# Patient Record
Sex: MALE | Race: WHITE | NOT HISPANIC OR LATINO | Employment: FULL TIME | URBAN - METROPOLITAN AREA
[De-identification: names, ages, dates, MRNs, and addresses within clinical notes are randomized per-mention and may not be internally consistent; named-entity substitution may affect disease eponyms.]

---

## 2017-01-31 ENCOUNTER — OFFICE VISIT (OUTPATIENT)
Dept: SURGERY | Facility: CLINIC | Age: 27
End: 2017-01-31
Payer: COMMERCIAL

## 2017-01-31 ENCOUNTER — HOSPITAL ENCOUNTER (OUTPATIENT)
Dept: RADIOLOGY | Facility: HOSPITAL | Age: 27
Discharge: HOME | End: 2017-01-31
Attending: SURGERY
Payer: COMMERCIAL

## 2017-01-31 VITALS
TEMPERATURE: 99 F | HEART RATE: 46 BPM | HEIGHT: 68 IN | SYSTOLIC BLOOD PRESSURE: 123 MMHG | DIASTOLIC BLOOD PRESSURE: 80 MMHG | BODY MASS INDEX: 43.35 KG/M2 | WEIGHT: 286 LBS

## 2017-01-31 DIAGNOSIS — R22.0 SCALP MASS: ICD-10-CM

## 2017-01-31 DIAGNOSIS — R22.0 MASS OF HEAD: Primary | ICD-10-CM

## 2017-01-31 PROCEDURE — 99999 PR PBB SHADOW E&M-NEW PATIENT-LVL III: CPT | Mod: PBBFAC,,, | Performed by: SURGERY

## 2017-01-31 PROCEDURE — 1159F MED LIST DOCD IN RCRD: CPT | Mod: S$GLB,,, | Performed by: SURGERY

## 2017-01-31 PROCEDURE — 70450 CT HEAD/BRAIN W/O DYE: CPT | Mod: TC

## 2017-01-31 PROCEDURE — 70450 CT HEAD/BRAIN W/O DYE: CPT | Mod: 26,,, | Performed by: RADIOLOGY

## 2017-01-31 PROCEDURE — 99203 OFFICE O/P NEW LOW 30 MIN: CPT | Mod: S$GLB,,, | Performed by: SURGERY

## 2017-01-31 NOTE — Clinical Note
January 31, 2017      Vicky Bravo MD  [address] [place] [address]           [facility]  [address]  [place] [address]  Phone: [phone]          Patient: Gurwinder Sainz   MR Number: [id number]   YOB: 1990   Date of Visit: 1/31/2017       Dear Dr. Vicky Bravo:    Thank you for referring Gurwinder Sainz to me for evaluation. Attached you will find relevant portions of my assessment and plan of care.    If you have questions, please do not hesitate to call me. I look forward to following Gurwinder Sainz along with you.    Sincerely,    Steve Tucker MD    Enclosure  CC:  No Recipients    If you would like to receive this communication electronically, please contact [email or web address] or [phone] to request more information on [facility] Link access.    For providers and/or their staff who would like to refer a patient to [facility], please contact us through our one-stop-shop provider referral line, [facility], at [phone].    If you feel you have received this communication in error or would no longer like to receive these types of communications, please e-mail [email or web address]

## 2017-01-31 NOTE — PROGRESS NOTES
"History & Physical    SUBJECTIVE:     History of Present Illness:  Patient is a 26 y.o. male presents with scalp mass.  He first noticed it 2 years ago.  He thinks it is more noticeable.  He thinks it is asymptomatic but he does get sinus trouble.      Chief Complaint   Patient presents with    Consult     scalp mass       Review of patient's allergies indicates:   Allergen Reactions    Flagyl [metronidazole hcl]     Pecan nut     Pineapple     [place]        No current outpatient prescriptions on file.     No current facility-administered medications for this visit.        History reviewed. No pertinent past medical history.  Past Surgical History   Procedure Laterality Date    Testicular torsion procedure      Tonsillectomy      Upper wisdom teeth       Family History   Problem Relation Age of Onset    Diabetes Mother     Diabetes Father      Social History   Substance Use Topics    Smoking status: Former Smoker     Quit date: 7/31/2015    Smokeless tobacco: None    Alcohol use None        Review of Systems:  Review of Systems   Constitutional: Negative for fever and unexpected weight change.   Respiratory: Negative for chest tightness and shortness of breath.    Cardiovascular: Negative for chest pain.   Gastrointestinal: Negative for abdominal pain, constipation, diarrhea, nausea and vomiting.   Genitourinary: Negative for difficulty urinating and dysuria.   Skin: Positive for wound. Negative for rash.        Has burns on his forearms from work ()   Hematological: Does not bruise/bleed easily.       OBJECTIVE:     Vital Signs (Most Recent)  Temp: 98.5 °F (36.9 °C) (01/31/17 0735)  Pulse: (!) 46 (01/31/17 0735)  BP: 123/80 (01/31/17 0735)  5' 8" (1.727 m)  129.7 kg (286 lb)     Physical Exam:  Physical Exam   Constitutional: He is oriented to person, place, and time. He appears well-developed and well-nourished.   Neck: Normal range of motion. Neck supple.   Cardiovascular: Normal rate, regular " rhythm and normal heart sounds.    Pulmonary/Chest: Effort normal and breath sounds normal.   Neurological: He is alert and oriented to person, place, and time.   Skin: Skin is warm and dry.        Psychiatric: He has a normal mood and affect. His behavior is normal. Judgment and thought content normal.   Vitals reviewed.      Laboratory  none available    Diagnostic Results:  none available    ASSESSMENT/PLAN:     Scalp mass, fixed    PLAN:Plan     CT

## 2017-01-31 NOTE — LETTER
[facility] - General Surgery  [address]  [place] [address]  Phone: [phone] January 31, 2017      Vicky Bravo MD  [address] [facility] [address]    Patient: Gurwinder Sainz   MR Number: [id number]   YOB: 1990   Date of Visit: 1/31/2017     Dear Dr. Bravo:    Thank you for referring Gurwinder Sainz to me for evaluation. Below are the relevant portions of my assessment and plan of care.    Patient is a 26-year-old male presents with scalp mass, fixed.     PLAN:   -  CT    If you have questions, please do not hesitate to call me. I look forward to following Gurwinder along with you.    Sincerely,      Steve Tucker MD   Section Head - General, Laparoscopic, Bariatric  Acute Care and Oncologic Surgery   - Surgical Weight Loss Program  [facility]    WSR/camila

## 2017-02-01 ENCOUNTER — TELEPHONE (OUTPATIENT)
Dept: SURGERY | Facility: CLINIC | Age: 27
End: 2017-02-01

## 2017-02-01 NOTE — TELEPHONE ENCOUNTER
Notified pt of CT results and that Dr. Tucker has contacted neurosurgery for recommendations on removal.  Pt verbalizes understanding.

## 2017-02-01 NOTE — TELEPHONE ENCOUNTER
----- Message from Steve Tucker MD sent at 2/1/2017  2:38 PM CST -----  I have.  I sent a letter to Dr. Núñez to see what his thoughts are on it and he hasn't answered, yet.  ----- Message -----     From: Jyotsna Lara RN     Sent: 1/31/2017   4:41 PM       To: Steve Tucker MD    Can you please review his CT results?  ----- Message -----     From: Jae Kumar     Sent: 1/31/2017   3:57 PM       To: Garrett Aparicio Staff    Patient states that he needs to speak with nurse in ref to if his ins cleared him for the CT and was the peer to peer done;pt also states that he would like to know if his results were in from CT//please call back at [phone]//thank you

## 2017-02-01 NOTE — TELEPHONE ENCOUNTER
----- Message from Tommy Sigala sent at 2/1/2017  4:36 PM CST -----  Pt missed call regarding results. Please call him back at [phone]

## 2017-02-07 ENCOUNTER — TELEPHONE (OUTPATIENT)
Dept: SURGERY | Facility: CLINIC | Age: 27
End: 2017-02-07

## 2017-02-07 NOTE — TELEPHONE ENCOUNTER
----- Message from Tommy Sigala sent at 2/7/2017  9:59 AM CST -----  Pt is returning missed phone call. Please call pt back at [phone]

## 2017-02-07 NOTE — TELEPHONE ENCOUNTER
Notified pt of 's recommendations of seeing Dr. Núñez.  Pt to call to set up appt with his office.

## 2017-02-07 NOTE — TELEPHONE ENCOUNTER
----- Message from Steve Tucker MD sent at 2/3/2017  4:43 PM CST -----   Dr. Núñez says it looks like an osteoid osteoma, a benign lesion.  He could see Dr. Núñez electively to hear about it and options.  I know he is concerned as it has been growing.  ----- Message -----     From: Steve Tucker MD     Sent: 1/31/2017   3:57 PM       To: Aren Núñez MD, Steve Tucker MD    I got this ct to look at a lesion of the right forehead which is growing.  Do you know what do I do about that and the other findings?    Thanks.

## 2018-04-05 ENCOUNTER — HOSPITAL ENCOUNTER (EMERGENCY)
Facility: HOSPITAL | Age: 28
Discharge: HOME | End: 2018-04-05
Attending: FAMILY MEDICINE
Payer: COMMERCIAL

## 2018-04-05 VITALS
BODY MASS INDEX: 42.21 KG/M2 | RESPIRATION RATE: 18 BRPM | DIASTOLIC BLOOD PRESSURE: 92 MMHG | HEIGHT: 69 IN | HEART RATE: 53 BPM | WEIGHT: 285 LBS | OXYGEN SATURATION: 96 % | SYSTOLIC BLOOD PRESSURE: 143 MMHG | TEMPERATURE: 99 F

## 2018-04-05 DIAGNOSIS — K92.1 BLOOD IN STOOL: Primary | ICD-10-CM

## 2018-04-05 LAB
ALBUMIN SERPL BCP-MCNC: 4.3 G/DL
ALP SERPL-CCNC: 104 U/L
ALT SERPL W/O P-5'-P-CCNC: 27 U/L
ANION GAP SERPL CALC-SCNC: 9 MMOL/L
AST SERPL-CCNC: 23 U/L
BASOPHILS # BLD AUTO: 0.06 K/UL
BASOPHILS NFR BLD: 0.6 %
BILIRUB SERPL-MCNC: 0.9 MG/DL
BILIRUB UR QL STRIP: NEGATIVE
BUN SERPL-MCNC: 12 MG/DL
CALCIUM SERPL-MCNC: 10 MG/DL
CHLORIDE SERPL-SCNC: 105 MMOL/L
CLARITY UR REFRACT.AUTO: CLEAR
CO2 SERPL-SCNC: 24 MMOL/L
COLOR UR AUTO: YELLOW
CREAT SERPL-MCNC: 0.8 MG/DL
DIFFERENTIAL METHOD: NORMAL
EOSINOPHIL # BLD AUTO: 0.2 K/UL
EOSINOPHIL NFR BLD: 1.7 %
ERYTHROCYTE [DISTWIDTH] IN BLOOD BY AUTOMATED COUNT: 13.3 %
EST. GFR  (AFRICAN AMERICAN): >60 ML/MIN/1.73 M^2
EST. GFR  (NON AFRICAN AMERICAN): >60 ML/MIN/1.73 M^2
GLUCOSE SERPL-MCNC: 92 MG/DL
GLUCOSE UR QL STRIP: NEGATIVE
HCT VFR BLD AUTO: 47.8 %
HGB BLD-MCNC: 16.2 G/DL
HGB UR QL STRIP: NEGATIVE
IMM GRANULOCYTES # BLD AUTO: 0.03 K/UL
IMM GRANULOCYTES NFR BLD AUTO: 0.3 %
KETONES UR QL STRIP: NEGATIVE
LEUKOCYTE ESTERASE UR QL STRIP: NEGATIVE
LIPASE SERPL-CCNC: 24 U/L
LYMPHOCYTES # BLD AUTO: 3.1 K/UL
LYMPHOCYTES NFR BLD: 30.2 %
MCH RBC QN AUTO: 28.9 PG
MCHC RBC AUTO-ENTMCNC: 33.9 G/DL
MCV RBC AUTO: 85 FL
MONOCYTES # BLD AUTO: 1 K/UL
MONOCYTES NFR BLD: 9.6 %
NEUTROPHILS # BLD AUTO: 5.9 K/UL
NEUTROPHILS NFR BLD: 57.6 %
NITRITE UR QL STRIP: NEGATIVE
NRBC BLD-RTO: 0 /100 WBC
PH UR STRIP: 6 [PH] (ref 5–8)
PLATELET # BLD AUTO: 332 K/UL
PMV BLD AUTO: 9.6 FL
POTASSIUM SERPL-SCNC: 4.4 MMOL/L
PROT SERPL-MCNC: 8.2 G/DL
PROT UR QL STRIP: NEGATIVE
RBC # BLD AUTO: 5.61 M/UL
SODIUM SERPL-SCNC: 138 MMOL/L
SP GR UR STRIP: 1.02 (ref 1–1.03)
URN SPEC COLLECT METH UR: NORMAL
UROBILINOGEN UR STRIP-ACNC: NEGATIVE EU/DL
WBC # BLD AUTO: 10.29 K/UL

## 2018-04-05 PROCEDURE — 96374 THER/PROPH/DIAG INJ IV PUSH: CPT

## 2018-04-05 PROCEDURE — 96361 HYDRATE IV INFUSION ADD-ON: CPT

## 2018-04-05 PROCEDURE — 85025 COMPLETE CBC W/AUTO DIFF WBC: CPT

## 2018-04-05 PROCEDURE — 80053 COMPREHEN METABOLIC PANEL: CPT

## 2018-04-05 PROCEDURE — 63600175 PHARM REV CODE 636 W HCPCS: Performed by: PHYSICIAN ASSISTANT

## 2018-04-05 PROCEDURE — 99283 EMERGENCY DEPT VISIT LOW MDM: CPT | Mod: ,,, | Performed by: FAMILY MEDICINE

## 2018-04-05 PROCEDURE — 83690 ASSAY OF LIPASE: CPT

## 2018-04-05 PROCEDURE — 81003 URINALYSIS AUTO W/O SCOPE: CPT

## 2018-04-05 PROCEDURE — 99283 EMERGENCY DEPT VISIT LOW MDM: CPT | Mod: 25

## 2018-04-05 PROCEDURE — 25000003 PHARM REV CODE 250: Performed by: PHYSICIAN ASSISTANT

## 2018-04-05 RX ORDER — KETOROLAC TROMETHAMINE 30 MG/ML
10 INJECTION, SOLUTION INTRAMUSCULAR; INTRAVENOUS
Status: COMPLETED | OUTPATIENT
Start: 2018-04-05 | End: 2018-04-05

## 2018-04-05 RX ORDER — DICYCLOMINE HYDROCHLORIDE 20 MG/1
20 TABLET ORAL 2 TIMES DAILY PRN
Qty: 15 TABLET | Refills: 0 | Status: SHIPPED | OUTPATIENT
Start: 2018-04-05 | End: 2018-04-10

## 2018-04-05 RX ADMIN — SODIUM CHLORIDE 1000 ML: 0.9 INJECTION, SOLUTION INTRAVENOUS at 11:04

## 2018-04-05 RX ADMIN — KETOROLAC TROMETHAMINE 10 MG: 30 INJECTION, SOLUTION INTRAMUSCULAR at 11:04

## 2018-04-05 NOTE — ED NOTES
At discharge pt AOX3, resp even/unlabored,skin  W/d, GARCIA well, pt reports feeling better upon discharge, NAD at this time.

## 2018-04-05 NOTE — ED TRIAGE NOTES
Patient states he has blood in his stool and abdominal cramps x 1 week.  Patient states there were blood clots in the toilet.

## 2018-04-05 NOTE — ED NOTES
LOC: The patient is awake, alert and aware of environment with an appropriate affect, the patient is oriented x 3 and speaking appropriately.  APPEARANCE: Patient resting comfortably and in no acute distress, patient is clean and well groomed, patient's clothing is properly fastened.  SKIN: The skin is warm and dry, color consistent with ethnicity, patient has normal skin turgor and moist mucus membranes, skin intact, no breakdown or bruising noted.  MUSCULOSKELETAL: Patient moving all extremities well, no obvious swelling or deformities noted.  RESPIRATORY: Airway is open and patent, respirations are spontaneous, patient has a normal effort and rate, no accessory muscle use noted.  CARDIAC: Patient has a normal rate and rhythm, no periphreal edema noted, capillary refill < 3 seconds.  ABDOMEN: Pt c/o abdominal cramping, bloating, and blood in stool.   NEUROLOGIC: eyes open spontaneously, behavior appropriate to situation, follows commands, facial expression symmetrical, bilateral hand grasp equal and even, purposeful motor response noted, normal sensation in all extremities when touched with a finger.

## 2018-04-05 NOTE — ED PROVIDER NOTES
Encounter Date: 4/5/2018       History     Chief Complaint   Patient presents with    Rectal Bleeding     Patient is a 27-year-old male with past medical history of abdominal pain and rectal bleeding who presents to the emergency department due to a 1 week history of bloody stool.  Patient states that he had an episode of bright red blood per rectum last Thursday.  Patient states he's had this previously and was seen in [place] by GI.  Patient states he was diagnosed with colitis and discharged on antibiotics.  Patient states that after having a bloody bowel movement last Thursday he contacted Dr. Walker and GI and has appointment with him on Tuesday.  Patient states that he had an additional episode of bright red blood per rectum this morning, as well as blood in his stool.  Patient states he's been having a cramping sensation but no diarrhea.  Patient denies any fevers, chills, chest pain, shortness of breath, or any other complaints.          Review of patient's allergies indicates:   Allergen Reactions    Flagyl [metronidazole hcl]     Pecan nut     Pineapple     [place]      History reviewed. No pertinent past medical history.  Past Surgical History:   Procedure Laterality Date    testicular torsion procedure      TONSILLECTOMY      upper wisdom teeth       Family History   Problem Relation Age of Onset    Diabetes Mother     Diabetes Father      Social History   Substance Use Topics    Smoking status: Former Smoker     Quit date: 7/31/2015    Smokeless tobacco: Never Used    Alcohol use Yes      Comment: once a month     Review of Systems   Constitutional: Negative for activity change, appetite change, diaphoresis, fatigue and fever.   HENT: Negative for congestion, dental problem, drooling, ear pain, facial swelling, sore throat and trouble swallowing.    Eyes: Negative for pain, discharge and visual disturbance.   Respiratory: Negative for apnea, cough, chest tightness and shortness of breath.     Cardiovascular: Negative for chest pain and palpitations.   Gastrointestinal: Positive for blood in stool. Negative for abdominal distention, anal bleeding, diarrhea, nausea and vomiting.   Endocrine: Negative for cold intolerance and polydipsia.   Genitourinary: Negative for decreased urine volume, difficulty urinating, enuresis, frequency and hematuria.   Musculoskeletal: Negative for arthralgias, gait problem, myalgias and neck stiffness.   Skin: Negative for color change and pallor.   Allergic/Immunologic: Negative for environmental allergies.   Neurological: Negative for dizziness, syncope, numbness and headaches.   Psychiatric/Behavioral: Negative for agitation, confusion and dysphoric mood.       Physical Exam     Initial Vitals [04/05/18 0945]   BP Pulse Resp Temp SpO2   (!) 137/96 68 20 98.3 °F (36.8 °C) 98 %      MAP       109.67         Physical Exam    Nursing note and vitals reviewed.  Constitutional: He appears well-developed and well-nourished. He is not diaphoretic. No distress.   HENT:   Head: Normocephalic and atraumatic.   Neck: Normal range of motion. Neck supple.   Cardiovascular: Normal rate, regular rhythm and normal heart sounds. Exam reveals no gallop and no friction rub.    No murmur heard.  Pulmonary/Chest: Breath sounds normal. He has no wheezes. He has no rhonchi. He has no rales.   Abdominal: Soft. Bowel sounds are normal. There is no tenderness. There is no rebound and no guarding.   Genitourinary: Rectal exam shows guaiac negative stool. Guaiac negative stool. : Acceptable.  Musculoskeletal: Normal range of motion.   Neurological: He is alert and oriented to person, place, and time.   Skin: Skin is warm and dry. No rash noted. No erythema.   Psychiatric: He has a normal mood and affect.         ED Course   Procedures  Labs Reviewed   CBC W/ AUTO DIFFERENTIAL   COMPREHENSIVE METABOLIC PANEL   LIPASE   URINALYSIS                   APC / Resident Notes:   Patient is a  27-year-old male with past medical history of abdominal pain and rectal bleeding who presents to the emergency department due to a 1 week history of bloody stool.  Physical exam reveals male in no acute distress.  Heart regular rate and rhythm.  Lungs clear to auscultation bilaterally.  Abdomen soft nontender nondistended.  Will obtain lab work.    Urinalysis unremarkable.  CBC shows hemoglobin of 16.2.  CMP unremarkable.  Lipase 24.  Patient will be discharged home in stable condition and is to follow-up with GI on Tuesday.  Patient will be given Bentyl for crampy abdominal pain.  Plan of treatment discussed with attending physician he is agreeable.                 Clinical Impression:   There were no encounter diagnoses.    Disposition:   Disposition: Discharged  Condition: Stable                        Rachna Gupta PA-C  04/05/18 1200       Rachna Gupta PA-C  04/05/18 1201       Rachna Gupta PA-C  04/05/18 5471

## 2018-04-10 ENCOUNTER — OFFICE VISIT (OUTPATIENT)
Dept: SURGERY | Facility: CLINIC | Age: 28
End: 2018-04-10
Payer: COMMERCIAL

## 2018-04-10 ENCOUNTER — TELEPHONE (OUTPATIENT)
Dept: ENDOSCOPY | Facility: HOSPITAL | Age: 28
End: 2018-04-10

## 2018-04-10 ENCOUNTER — LAB VISIT (OUTPATIENT)
Dept: LAB | Facility: HOSPITAL | Age: 28
End: 2018-04-10
Attending: COLON & RECTAL SURGERY
Payer: COMMERCIAL

## 2018-04-10 VITALS
WEIGHT: 288.38 LBS | SYSTOLIC BLOOD PRESSURE: 138 MMHG | HEART RATE: 72 BPM | DIASTOLIC BLOOD PRESSURE: 87 MMHG | HEIGHT: 69 IN | BODY MASS INDEX: 42.71 KG/M2

## 2018-04-10 DIAGNOSIS — Z12.11 SPECIAL SCREENING FOR MALIGNANT NEOPLASMS, COLON: Primary | ICD-10-CM

## 2018-04-10 DIAGNOSIS — K62.5 RECTAL BLEEDING: ICD-10-CM

## 2018-04-10 DIAGNOSIS — K92.1 HEMATOCHEZIA: Primary | ICD-10-CM

## 2018-04-10 LAB
ALBUMIN SERPL BCP-MCNC: 4.2 G/DL
ALP SERPL-CCNC: 95 U/L
ALT SERPL W/O P-5'-P-CCNC: 26 U/L
ANION GAP SERPL CALC-SCNC: 7 MMOL/L
AST SERPL-CCNC: 21 U/L
BASOPHILS # BLD AUTO: 0.06 K/UL
BASOPHILS NFR BLD: 0.7 %
BILIRUB SERPL-MCNC: 0.6 MG/DL
BUN SERPL-MCNC: 14 MG/DL
CALCIUM SERPL-MCNC: 9.5 MG/DL
CHLORIDE SERPL-SCNC: 104 MMOL/L
CO2 SERPL-SCNC: 26 MMOL/L
CREAT SERPL-MCNC: 0.9 MG/DL
CRP SERPL-MCNC: 5.3 MG/L
DIFFERENTIAL METHOD: NORMAL
EOSINOPHIL # BLD AUTO: 0.1 K/UL
EOSINOPHIL NFR BLD: 1.6 %
ERYTHROCYTE [DISTWIDTH] IN BLOOD BY AUTOMATED COUNT: 13.3 %
EST. GFR  (AFRICAN AMERICAN): >60 ML/MIN/1.73 M^2
EST. GFR  (NON AFRICAN AMERICAN): >60 ML/MIN/1.73 M^2
GLUCOSE SERPL-MCNC: 102 MG/DL
HCT VFR BLD AUTO: 46.3 %
HGB BLD-MCNC: 15.6 G/DL
IMM GRANULOCYTES # BLD AUTO: 0.02 K/UL
IMM GRANULOCYTES NFR BLD AUTO: 0.2 %
LYMPHOCYTES # BLD AUTO: 2.8 K/UL
LYMPHOCYTES NFR BLD: 30.7 %
MCH RBC QN AUTO: 28.8 PG
MCHC RBC AUTO-ENTMCNC: 33.7 G/DL
MCV RBC AUTO: 86 FL
MONOCYTES # BLD AUTO: 0.8 K/UL
MONOCYTES NFR BLD: 9 %
NEUTROPHILS # BLD AUTO: 5.2 K/UL
NEUTROPHILS NFR BLD: 57.8 %
NRBC BLD-RTO: 0 /100 WBC
PLATELET # BLD AUTO: 342 K/UL
PMV BLD AUTO: 10.1 FL
POTASSIUM SERPL-SCNC: 3.9 MMOL/L
PREALB SERPL-MCNC: 24 MG/DL
PROT SERPL-MCNC: 7.9 G/DL
RBC # BLD AUTO: 5.41 M/UL
SODIUM SERPL-SCNC: 137 MMOL/L
WBC # BLD AUTO: 9.03 K/UL

## 2018-04-10 PROCEDURE — 86140 C-REACTIVE PROTEIN: CPT

## 2018-04-10 PROCEDURE — 36415 COLL VENOUS BLD VENIPUNCTURE: CPT

## 2018-04-10 PROCEDURE — 84134 ASSAY OF PREALBUMIN: CPT

## 2018-04-10 PROCEDURE — 80053 COMPREHEN METABOLIC PANEL: CPT

## 2018-04-10 PROCEDURE — 99999 PR PBB SHADOW E&M-EST. PATIENT-LVL III: CPT | Mod: PBBFAC,,, | Performed by: COLON & RECTAL SURGERY

## 2018-04-10 PROCEDURE — 99204 OFFICE O/P NEW MOD 45 MIN: CPT | Mod: S$GLB,,, | Performed by: COLON & RECTAL SURGERY

## 2018-04-10 PROCEDURE — 85025 COMPLETE CBC W/AUTO DIFF WBC: CPT

## 2018-04-10 RX ORDER — SODIUM, POTASSIUM,MAG SULFATES 17.5-3.13G
1 SOLUTION, RECONSTITUTED, ORAL ORAL ONCE
Qty: 1 BOTTLE | Refills: 0 | Status: SHIPPED | OUTPATIENT
Start: 2018-04-10 | End: 2018-04-10

## 2018-04-10 NOTE — PROGRESS NOTES
CRS Office Visit History and Physical    Referring Md:   Virgil Lopez Md  [address] [facility]  [place] [address]    SUBJECTIVE:     Chief Complaint: rectal bleeding    History of Present Illness:  Patient is a 27 y.o. male presents with rectal bleeding.  2 weeks 3 weeks.  Ten-year history of diarrhea which has been evaluated in the in the past with colonoscopy.  Has never been treated for inflammatory bowel disease.  He has a sister with ulcerative colitis.  Reports crampy left-sided abdominal pain on a daily basis.  Pain comes and goes.  Associated with fried food and acidic food.  5 pound weight loss over 3 weeks.    Last Colonoscopy: 2012      No past medical history on file.    Past Surgical History:   Procedure Laterality Date    testicular torsion procedure      TONSILLECTOMY      upper wisdom teeth         Review of patient's allergies indicates:   Allergen Reactions    Flagyl [metronidazole hcl]     Pecan nut     Pineapple     [place]        Current Outpatient Prescriptions on File Prior to Visit   Medication Sig Dispense Refill    dicyclomine (BENTYL) 20 mg tablet Take 1 tablet (20 mg total) by mouth 2 (two) times daily as needed. 15 tablet 0     No current facility-administered medications on file prior to visit.        Family History   Problem Relation Age of Onset    Diabetes Mother     Diabetes Father        Social History   Substance Use Topics    Smoking status: Former Smoker     Quit date: 7/31/2015    Smokeless tobacco: Never Used    Alcohol use Yes      Comment: once a month        Review of Systems:  ROS:  GENERAL: No fever, chills, fatigability or weight loss.  Integument:  No rashes, redness, icterus  CHEST: Denies GRIFFITH, cyanosis, wheezing, cough and sputum production.  CARDIOVASCULAR: Denies chest pain, PND, orthopnea or reduced exercise tolerance.  GI:  Denies abd pain, dysphagia, nausea, vomiting, no hematemesis, no rectal pain  : Denies burning on  "urination, no hematuria, no bacteriuria  MSK:  No deformities, swelling, joint pain swelling  Neurologic:  No HAs, seizures, weakness, paresthesias, gait problems      OBJECTIVE:     Vital Signs (Most Recent)  /87 (BP Location: Left arm, Patient Position: Sitting, BP Method: Large (Automatic))   Pulse 72   Ht 5' 9" (1.753 m)   Wt 130.8 kg (288 lb 5.8 oz)   BMI 42.58 kg/m²     Physical Exam:  General: White male obese in no acute distress   Skin/ Sclera anicteric  LN nonpalpable  HEENT: anicteric, normocephalic, extraocular movements intact   Neck trachea midline, thyroid not enlarged  Chest symmetric, nl excursions, no retractions  Respiratory: respirations are even and unlabored  Abdomen soft, focal tenderness left side, epigastric area.  No peritoneal signs and no masses.  No organomegaly.  Extremities: Warm dry and intact.  NO CCE  Neuro: alert and oriented x 4.  Moves all extremities.         ASSESSMENT/PLAN:   Rectal bleeding  Abdominal pain  Chronic diarrhea  Family history of colitis      Recommend  CT scan abdomen and pelvis  Colonoscopy  Check lab work            "

## 2018-04-10 NOTE — LETTER
April 10, 2018      Virgil Lopez MD  [address] [facility]  [place] [address]           [facility]  [address]  [facility] [address]  Phone: [phone]          Patient: Gurwinder Sainz   MR Number: [id number]   YOB: 1990   Date of Visit: 4/10/2018       Dear Dr. Virgil Lopez:    Thank you for referring Gurwinder Sainz to me for evaluation. Attached you will find relevant portions of my assessment and plan of care.    If you have questions, please do not hesitate to call me. I look forward to following Gurwinder Sainz along with you.    Sincerely,    DIEGO Walker MD    Enclosure  CC:  No Recipients    If you would like to receive this communication electronically, please contact [email or web address] or [phone] to request more information on [email or web address] Link access.    For providers and/or their staff who would like to refer a patient to [facility], please contact us through our one-stop-shop provider referral line, [facility] , at [phone].    If you feel you have received this communication in error or would no longer like to receive these types of communications, please e-mail [email or web address]

## 2018-04-13 ENCOUNTER — HOSPITAL ENCOUNTER (OUTPATIENT)
Dept: RADIOLOGY | Facility: HOSPITAL | Age: 28
Discharge: HOME | End: 2018-04-13
Attending: COLON & RECTAL SURGERY
Payer: COMMERCIAL

## 2018-04-13 DIAGNOSIS — K62.5 RECTAL BLEEDING: ICD-10-CM

## 2018-04-13 PROCEDURE — 25500020 PHARM REV CODE 255: Performed by: COLON & RECTAL SURGERY

## 2018-04-13 PROCEDURE — 74178 CT ABD&PLV WO CNTR FLWD CNTR: CPT | Mod: 26,,, | Performed by: RADIOLOGY

## 2018-04-13 PROCEDURE — 74178 CT ABD&PLV WO CNTR FLWD CNTR: CPT | Mod: TC

## 2018-04-13 RX ADMIN — IOHEXOL 15 ML: 350 INJECTION, SOLUTION INTRAVENOUS at 05:04

## 2018-04-13 RX ADMIN — IOHEXOL 100 ML: 350 INJECTION, SOLUTION INTRAVENOUS at 07:04

## 2018-04-13 RX ADMIN — IOHEXOL 15 ML: 350 INJECTION, SOLUTION INTRAVENOUS at 06:04

## 2018-04-16 ENCOUNTER — SURGERY (OUTPATIENT)
Age: 28
End: 2018-04-16

## 2018-04-16 ENCOUNTER — ANESTHESIA EVENT (OUTPATIENT)
Dept: ENDOSCOPY | Facility: HOSPITAL | Age: 28
End: 2018-04-16
Payer: COMMERCIAL

## 2018-04-16 ENCOUNTER — HOSPITAL ENCOUNTER (OUTPATIENT)
Facility: HOSPITAL | Age: 28
Discharge: HOME | End: 2018-04-16
Attending: COLON & RECTAL SURGERY | Admitting: COLON & RECTAL SURGERY
Payer: COMMERCIAL

## 2018-04-16 ENCOUNTER — ANESTHESIA (OUTPATIENT)
Dept: ENDOSCOPY | Facility: HOSPITAL | Age: 28
End: 2018-04-16
Payer: COMMERCIAL

## 2018-04-16 VITALS
HEIGHT: 69 IN | OXYGEN SATURATION: 99 % | HEART RATE: 61 BPM | BODY MASS INDEX: 42.21 KG/M2 | SYSTOLIC BLOOD PRESSURE: 111 MMHG | RESPIRATION RATE: 18 BRPM | DIASTOLIC BLOOD PRESSURE: 69 MMHG | TEMPERATURE: 98 F | WEIGHT: 285 LBS

## 2018-04-16 PROCEDURE — 45378 DIAGNOSTIC COLONOSCOPY: CPT | Performed by: COLON & RECTAL SURGERY

## 2018-04-16 PROCEDURE — 37000008 HC ANESTHESIA 1ST 15 MINUTES: Performed by: COLON & RECTAL SURGERY

## 2018-04-16 PROCEDURE — 25000003 PHARM REV CODE 250: Performed by: NURSE ANESTHETIST, CERTIFIED REGISTERED

## 2018-04-16 PROCEDURE — D9220A PRA ANESTHESIA: Mod: CRNA,,, | Performed by: NURSE ANESTHETIST, CERTIFIED REGISTERED

## 2018-04-16 PROCEDURE — 37000009 HC ANESTHESIA EA ADD 15 MINS: Performed by: COLON & RECTAL SURGERY

## 2018-04-16 PROCEDURE — 63600175 PHARM REV CODE 636 W HCPCS: Performed by: NURSE ANESTHETIST, CERTIFIED REGISTERED

## 2018-04-16 PROCEDURE — D9220A PRA ANESTHESIA: Mod: ANES,,, | Performed by: ANESTHESIOLOGY

## 2018-04-16 PROCEDURE — 45378 DIAGNOSTIC COLONOSCOPY: CPT | Mod: ,,, | Performed by: COLON & RECTAL SURGERY

## 2018-04-16 RX ORDER — SODIUM CHLORIDE 9 MG/ML
INJECTION, SOLUTION INTRAVENOUS CONTINUOUS PRN
Status: DISCONTINUED | OUTPATIENT
Start: 2018-04-16 | End: 2018-04-16

## 2018-04-16 RX ORDER — PROPOFOL 10 MG/ML
VIAL (ML) INTRAVENOUS
Status: DISCONTINUED | OUTPATIENT
Start: 2018-04-16 | End: 2018-04-16

## 2018-04-16 RX ORDER — PROPOFOL 10 MG/ML
VIAL (ML) INTRAVENOUS CONTINUOUS PRN
Status: DISCONTINUED | OUTPATIENT
Start: 2018-04-16 | End: 2018-04-16

## 2018-04-16 RX ADMIN — SODIUM CHLORIDE: 0.9 INJECTION, SOLUTION INTRAVENOUS at 11:04

## 2018-04-16 RX ADMIN — PROPOFOL 150 MG: 10 INJECTION, EMULSION INTRAVENOUS at 11:04

## 2018-04-16 RX ADMIN — PROPOFOL 150 MCG/KG/MIN: 10 INJECTION, EMULSION INTRAVENOUS at 11:04

## 2018-04-16 NOTE — ANESTHESIA PREPROCEDURE EVALUATION
04/16/2018  Gurwinder Sainz is a 27 y.o., male.    Anesthesia Evaluation    I have reviewed the Patient Summary Reports.     I have reviewed the Medications.     Review of Systems  Anesthesia Hx:  No problems with previous Anesthesia  History of prior surgery of interest to airway management or planning: Previous anesthesia: General   Social:  Former Smoker    Hematology/Oncology:  Hematology Normal   Oncology Normal     EENT/Dental:EENT/Dental Normal   Cardiovascular:  Cardiovascular Normal Exercise tolerance: good     Pulmonary:  Pulmonary Normal    Renal/:  Renal/ Normal     Hepatic/GI:  Hepatic/GI Normal    Musculoskeletal:  Musculoskeletal Normal    Neurological:  Neurology Normal    Endocrine:  Endocrine Normal    Dermatological:  Skin Normal    Psych:  Psychiatric Normal           Physical Exam  General:  Obesity    Airway/Jaw/Neck:  Airway Findings: Mouth Opening: Normal Tongue: Normal  General Airway Assessment: Adult  Mallampati: II  TM Distance: Normal, at least 6 cm  Jaw/Neck Findings:  Neck ROM: Normal ROM      Dental:  Dental Findings: In tact        Mental Status:  Mental Status Findings:  Cooperative, Alert and Oriented         Anesthesia Plan  Type of Anesthesia, risks & benefits discussed:  Anesthesia Type:  general  Patient's Preference: GA  Intra-op Monitoring Plan: standard ASA monitors  Intra-op Monitoring Plan Comments:   Post Op Pain Control Plan: multimodal analgesia  Post Op Pain Control Plan Comments:   Induction:   IV  Beta Blocker:  Patient is not currently on a Beta-Blocker (No further documentation required).       Informed Consent: Patient understands risks and agrees with Anesthesia plan.  Questions answered. Anesthesia consent signed with patient.  ASA Score: 2     Day of Surgery Review of History & Physical:  There are no significant changes.  H&P update referred to the  surgeon.         Ready For Surgery From Anesthesia Perspective.

## 2018-04-16 NOTE — DISCHARGE INSTRUCTIONS
Colonoscopy     A camera attached to a flexible tube with a viewing lens is used to take video pictures.     Colonoscopy is a test to view the inside of your lower digestive tract (colon and rectum). Sometimes it can show the last part of the small intestine (ileum). During the test, small pieces of tissue may be removed for testing. This is called a biopsy. Small growths, such as polyps, may also be removed.   Why is colonoscopy done?  The test is done to help look for colon cancer. And it can help find the source of abdominal pain, bleeding, and changes in bowel habits. It may be needed once a year, depending on factors such as your:  · Age  · Health history  · Family health history  · Symptoms  · Results from any prior colonoscopy  Risks and possible complications  These include:  · Bleeding               · A puncture or tear in the colon   · Risks of anesthesia  · A cancer lesion not being seen  Getting ready   To prepare for the test:  · Talk with your healthcare provider about the risks of the test (see below). Also ask your healthcare provider about alternatives to the test.  · Tell your healthcare provider about any medicines you take. Also tell him or her about any health conditions you may have.  · Make sure your rectum and colon are empty for the test. Follow the diet and bowel prep instructions exactly. If you dont, the test may need to be rescheduled.  · Plan for a friend or family member to drive you home after the test.     Colonoscopy provides an inside view of the entire colon.     You may discuss the results with your doctor right away or at a future visit.  During the test   The test is usually done in the hospital on an outpatient basis. This means you go home the same day. The procedure takes about 30 minutes. During that time:  · You are given relaxing (sedating) medicine through an IV line. You may be drowsy, or fully asleep.  · The healthcare provider will first give you a physical exam to  check for anal and rectal problems.  · Then the anus is lubricated and the scope inserted.  · If you are awake, you may have a feeling similar to needing to have a bowel movement. You may also feel pressure as air is pumped into the colon. Its OK to pass gas during the procedure.  · Biopsy, polyp removal, or other treatments may be done during the test.  After the test   You may have gas right after the test. It can help to try to pass it to help prevent later bloating. Your healthcare provider may discuss the results with you right away. Or you may need to schedule a follow-up visit to talk about the results. After the test, you can go back to your normal eating and other activities. You may be tired from the sedation and need to rest for a few hours.  Date Last Reviewed: 11/1/2016 © 2000-2017 [employer]. [address] [place] [address], [place] [address]. All rights reserved. This information is not intended as a substitute for professional medical care. Always follow your healthcare professional's instructions.

## 2018-04-16 NOTE — ANESTHESIA POSTPROCEDURE EVALUATION
"Anesthesia Post Evaluation    Patient: Gurwinder Sainz    Procedure(s) Performed: Procedure(s) (LRB):  COLONOSCOPY (N/A)    Final Anesthesia Type: general  Patient location during evaluation: PACU  Patient participation: Yes- Able to Participate  Level of consciousness: awake and alert  Post-procedure vital signs: reviewed and stable  Pain management: adequate  Airway patency: patent  PONV status at discharge: No PONV  Anesthetic complications: no      Cardiovascular status: blood pressure returned to baseline  Respiratory status: unassisted  Hydration status: euvolemic  Follow-up not needed.        Visit Vitals  /69   Pulse 61   Temp 36.6 °C (97.9 °F) (Temporal)   Resp 18   Ht 5' 9" (1.753 m)   Wt 129.3 kg (285 lb)   SpO2 99%   BMI 42.09 kg/m²       Pain/Hue Score: Pain Assessment Performed: Yes (4/16/2018 10:39 AM)  Presence of Pain: denies (4/16/2018 11:56 AM)  Hue Score: 10 (4/16/2018 12:28 PM)      "

## 2018-04-16 NOTE — TRANSFER OF CARE
"Anesthesia Transfer of Care Note    Patient: Gurwinder Sainz    Procedure(s) Performed: Procedure(s) (LRB):  COLONOSCOPY (N/A)    Patient location: GI    Anesthesia Type: general    Transport from OR: Transported from OR on 2-3 L/min O2 by NC with adequate spontaneous ventilation    Post pain: adequate analgesia    Post assessment: no apparent anesthetic complications and tolerated procedure well    Post vital signs: stable    Level of consciousness: sedated    Nausea/Vomiting: no nausea/vomiting    Complications: none    Transfer of care protocol was followed      Last vitals:   Visit Vitals  /65 (BP Location: Left arm, Patient Position: Lying)   Pulse 71   Temp 36.6 °C (97.9 °F) (Temporal)   Resp 16   Ht 5' 9" (1.753 m)   Wt 129.3 kg (285 lb)   SpO2 98%   BMI 42.09 kg/m²     "

## 2018-04-16 NOTE — H&P
Endoscopy H&P    Procedure : Colonoscopy      rectal bleeding, diarrhea and sister with UC      No past medical history on file.          Review of Systems -ROS:  GENERAL: No fever, chills, fatigability or weight loss.  CHEST: Denies GRIFFITH, cyanosis, wheezing, cough and sputum production.  CARDIOVASCULAR: Denies chest pain, PND, orthopnea or reduced exercise tolerance.   Musculoskeletal ROS: negative for - gait disturbance or joint pain  Neurological ROS: negative for - confusion or memory loss        Physical Exam:  General: well developed, well nourished, no distress  Head: normocephalic  Neck: supple, symmetrical, trachea midline  Lungs:  clear to auscultation bilaterally and normal respiratory effort  Heart: regular rate and rhythm, S1, S2 normal, no murmur, rub or gallop and regular rate and rhythm  Abdomen: soft, non-tender non-distented; bowel sounds normal; no masses,  no organomegaly  Extremities: no cyanosis or edema, or clubbing       Moderate Sedation (choice): Mallampati Score 1    ASA : II    IMP: as above    Plan: Colonoscopy with Moderate sedation.  I have explained the procedure including indications, alternatives, expected outcomes and potential complications. The patient appears to understand and gives informed consent. The patient is medically ready for surgery.

## 2018-04-16 NOTE — PROVATION PATIENT INSTRUCTIONS
Discharge Summary/Instructions after an Endoscopic Procedure  Patient Name: Gurwinder Sainz  Patient MRN: [id number]  Patient YOB: 1990  Monday, April 16, 2018  Charly Walker MD  RESTRICTIONS:  During your procedure today, you received medications for sedation.  These   medications may affect your judgment, balance and coordination.  Therefore,   for 24 hours, you have the following restrictions:   - DO NOT drive a car, operate machinery, make legal/financial decisions,   sign important papers or drink alcohol.    ACTIVITY:  The following day: return to full activity including work, except no heavy   lifting, straining or running for 3 days if polyps were removed.  DIET:  Eat and drink normally unless instructed otherwise.     TREATMENT FOR COMMON SIDE EFFECTS:  - Mild abdominal pain, nausea, belching, bloating or excessive gas:  rest,   eat lightly and use a heating pad.  - Sore Throat: treat with throat lozenges and/or gargle with warm salt   water.  - Because air was used during the procedure, expelling large amounts of air   from your rectum or belching is normal.  - If a bowel prep was taken, you may not have a bowel movement for 1-3 days.    This is normal.  SYMPTOMS TO WATCH FOR AND REPORT TO YOUR PHYSICIAN:  1. Abdominal pain or bloating, other than gas cramps.  2. Chest pain.  3. Back pain.  4. Signs of infection such as: chills or fever occurring within 24 hours   after the procedure.  5. Rectal bleeding, which would show as bright red, maroon, or black stools.   (A tablespoon of blood from the rectum is not serious, especially if   hemorrhoids are present.)  6. Vomiting.  7. Weakness or dizziness.  GO DIRECTLY TO THE NEAREST EMERGENCY ROOM IF YOU HAVE ANY OF THE FOLLOWING:      Difficulty breathing              Chills and/or fever over 101 F   Persistent vomiting and/or vomiting blood   Severe abdominal pain   Severe chest pain   Black, tarry stools   Bleeding- more than one tablespoon   Any  other symptom or condition that you feel may need urgent attention  Your doctor recommends these additional instructions:  If any biopsies were taken, your doctors clinic will contact you in 1 to 2   weeks with any results.  - Discharge patient to home (ambulatory).   - Patient has a contact number available for emergencies.  The signs and   symptoms of potential delayed complications were discussed with the   patient.  Return to normal activities tomorrow.  Written discharge   instructions were provided to the patient.   - High fiber diet for the rest of the patient's life.   - Continue present medications.   - Repeat colonoscopy (date not yet determined) for screening purposes.   - Return to my office PRN.  For questions, problems or results please call your physician - Charly Walker MD at Work:  [phone].  [facility], EMERGENCY ROOM PHONE NUMBER: [phone]  IF A COMPLICATION OR EMERGENCY SITUATION ARISES AND YOU ARE UNABLE TO REACH   YOUR PHYSICIAN - GO DIRECTLY TO THE EMERGENCY ROOM.  Charly Walker MD  4/16/2018 11:49:44 AM  This report has been verified and signed electronically.

## 2018-04-23 ENCOUNTER — TELEPHONE (OUTPATIENT)
Dept: ENDOSCOPY | Facility: HOSPITAL | Age: 28
End: 2018-04-23

## 2018-06-01 ENCOUNTER — NURSE TRIAGE (OUTPATIENT)
Dept: ADMINISTRATIVE | Facility: CLINIC | Age: 28
End: 2018-06-01

## 2018-06-01 NOTE — TELEPHONE ENCOUNTER
Reason for Disposition   Bloody, black, or tarry bowel movements    Protocols used: ST RECTAL BLEEDING-A-OH    Pt c/o blood in stool and LLQ abd pain that started today. States this happened in April and was same pain as now, dx with diverticulitis. Care advice given.

## 2019-08-29 ENCOUNTER — LAB VISIT (OUTPATIENT)
Dept: LAB | Facility: HOSPITAL | Age: 29
End: 2019-08-29
Attending: UROLOGY
Payer: COMMERCIAL

## 2019-08-29 ENCOUNTER — OFFICE VISIT (OUTPATIENT)
Dept: UROLOGY | Facility: CLINIC | Age: 29
End: 2019-08-29
Payer: COMMERCIAL

## 2019-08-29 VITALS
DIASTOLIC BLOOD PRESSURE: 84 MMHG | SYSTOLIC BLOOD PRESSURE: 117 MMHG | HEART RATE: 84 BPM | BODY MASS INDEX: 34.72 KG/M2 | WEIGHT: 242.5 LBS | HEIGHT: 70 IN

## 2019-08-29 DIAGNOSIS — E29.1 TESTICULAR FAILURE: Primary | ICD-10-CM

## 2019-08-29 DIAGNOSIS — I86.1 VARICOCELE: ICD-10-CM

## 2019-08-29 DIAGNOSIS — E29.1 TESTICULAR FAILURE: ICD-10-CM

## 2019-08-29 PROBLEM — K62.5 RECTAL BLEEDING: Status: RESOLVED | Noted: 2018-04-10 | Resolved: 2019-08-29

## 2019-08-29 LAB
ESTRADIOL SERPL-MCNC: 29 PG/ML (ref 11–44)
FSH SERPL-ACNC: 1.4 MIU/ML (ref 0.95–11.95)
LH SERPL-ACNC: 2.3 MIU/ML (ref 0.6–12.1)
PROLACTIN SERPL IA-MCNC: 4.4 NG/ML (ref 3.5–19.4)
TESTOST SERPL-MCNC: 320 NG/DL (ref 304–1227)

## 2019-08-29 PROCEDURE — 99999 PR PBB SHADOW E&M-EST. PATIENT-LVL III: ICD-10-PCS | Mod: PBBFAC,,, | Performed by: UROLOGY

## 2019-08-29 PROCEDURE — 84146 ASSAY OF PROLACTIN: CPT

## 2019-08-29 PROCEDURE — 82670 ASSAY OF TOTAL ESTRADIOL: CPT

## 2019-08-29 PROCEDURE — 99204 PR OFFICE/OUTPT VISIT, NEW, LEVL IV, 45-59 MIN: ICD-10-PCS | Mod: S$GLB,,, | Performed by: UROLOGY

## 2019-08-29 PROCEDURE — 99999 PR PBB SHADOW E&M-EST. PATIENT-LVL III: CPT | Mod: PBBFAC,,, | Performed by: UROLOGY

## 2019-08-29 PROCEDURE — 99204 OFFICE O/P NEW MOD 45 MIN: CPT | Mod: S$GLB,,, | Performed by: UROLOGY

## 2019-08-29 PROCEDURE — 83002 ASSAY OF GONADOTROPIN (LH): CPT

## 2019-08-29 PROCEDURE — 36415 COLL VENOUS BLD VENIPUNCTURE: CPT

## 2019-08-29 PROCEDURE — 83001 ASSAY OF GONADOTROPIN (FSH): CPT

## 2019-08-29 PROCEDURE — 84403 ASSAY OF TOTAL TESTOSTERONE: CPT

## 2019-08-29 NOTE — LETTER
August 29, 2019        Eugenio C. Labadie, MD  [address] [place] [address] Blvd  [address]  [facility]  [place] [address]             [facility] - Urology 4th Floor  [address]  [place] [address]  Phone: [phone]   Patient: Gurwinder Sainz   MR Number: [id number]   YOB: 1990   Date of Visit: 8/29/2019       Dear Dr. Labadie:    Thank you for referring Gurwinder Sainz to me for evaluation. Attached you will find relevant portions of my assessment and plan of care.    If you have questions, please do not hesitate to call me. I look forward to following Gurwinder Sainz along with you.    Sincerely,      Bjorn Rudd MD            CC  No Recipients    Enclosure

## 2019-08-29 NOTE — PATIENT INSTRUCTIONS
VARICOCELE    A varicocele is a swelling in the veins above the testicles. It is similar to a varicose vein in the legs. The swelling occurs when too much blood collects in the veins. It most often occurs around the left testicle. A varicocele may cause bluish discoloration of the scrotum (the sac of skin covering the testicles).  In most cases, a varicocele is not serious and does not require treatment. However, it can contribute to infertility. If you and your partner are trying to conceive, talk to your doctor about your options.    TREATMENT  Medications  There are no medications that correct this condition.  Surgery  Surgery can be done to close off the enlarged veins. Varicocele is not serious. And all surgery has risks. So you and your doctor may consider surgery only if: .  · The testicle shrinks (atrophy).  · You want to try to improve your fertility.  FOLLOW UP as advised by the doctor or our staff. Schedule regular exams with your doctor so the varicocele can be monitored. Be sure to keep all your appointments. Tell your doctor if there are any changes in your testicles or scrotum.   GET PROMPT MEDICAL ATTENTION if any of the following occurs:  · Increasing pain in the scrotum  · Trouble urinating  · A lump in the testicle  · A bulge in the groin area appears or is enlarging  © [id number] [facility], [address] [place] [address], [place] [address]. All rights reserved. This information is not intended as a substitute for professional medical care. Always follow your healthcare professional's instructions.

## 2019-08-29 NOTE — PROGRESS NOTES
"Chief Complaint:  Infertility    HPI:    Mr. Sainz is a 28 y.o.  male who has been  to his wife for the past 1 years. They have been trying to achieve a pregnancy for the past 1 years but without success. Gurwinder Sainz has undergone a semen analysis x 1, but I don't have the results. He denies a history of erectile dysfunction and ejaculatory problems.    He has a history of bilateral orchiopexy for torsion at age 13.    He has achieved 0 pregnancies in the past.    Patricia Soler is 27 years old. ( [birth year]) Her menses are regular. She has not undergone prior hysterosalpingogram. She has achieved 0 prior pregnancies.  She sees Dr. Labadie.    The couple has not undergone prior intrauterine insemination procedures.    The couple has not undergone prior in-vitro fertilization procedures.    Gurwinder Sainz denies a history of exposure to harmful chemicals, toxins, and radiation.    No history of recent fevers greater than 101.5 degrees Farenheit.    No history of recent exposure to "wet heat."    No history of urological trauma.  + torsion.    No history of prostatitis, epididymitis, and orchitis.    No history of post-pubertal mumps.    There is no known family history of fertility problems.    REVIEW OF SYSTEMS:     He denies headache, blurred vision, fever, nausea, vomiting, chills, flank discomfort, abdominal pain, bleeding per rectum, cough, SOB, recent loss of consciousness, recent mental status changes, seizures, dizziness, or upper/lower extremity weakness.    PHYSICAL EXAM:     The patient generally appears in good health, is appropriately interactive, and is in no apparent distress.     Eyes: anicteric sclerae, moist conjunctivae; no lid-lag; PERRLA     HENT: Atraumatic; oropharynx clear with moist mucous membranes and no mucosal ulcerations;normal hard and soft palate.  No evidence of lymphadenopathy.    Neck: Trachea midline.  No thyromegaly.    Musculoskeletal: No abnormal gait.    Skin: No " "lesions.    Mental: Cooperative with normal affect.  Is oriented to time, place, and person.    Neuro: Grossly intact.    Chest: Normal inspiratory effort.   No accessory muscles.  No audible wheezes.  Respirations symmetric on inspiration and expiration.    Heart: Regular rhythm.      Abdomen:  Soft, non-tender. No masses or organomegaly. Bladder is not palpable. No evidence of flank discomfort. No evidence of inguinal hernia.    Genitourinary: Penis is normal with no evidence of plaques or induration. Urethral meatus is normal. Scrotum is normal. Testes are descended bilaterally with no evidence of abnormal masses or tenderness. Epididymis, vas deferens, and cord structures are normal bilaterally.  Testicular volume is approximately 18 cc bilaterally. He has a L grade II varicocele.    Extremities: No cyanosis, clubbing, or edema.    IMPRESSION & PLAN:    Mr. Sainz is a 28 y.o.  male who has been  to his wife for the past 1 years. They have been trying to achieve a pregnancy for the past 1 years but without success. Gurwinder Sainz has undergone a semen analysis x 1, but I don't have the results. He denies a history of erectile dysfunction and ejaculatory problems.    He has a history of bilateral orchiopexy for torsion at age 13.    He has achieved 0 pregnancies in the past.    He has a L grade II varicocele.    1.  FSH, LH, testosterone, prolactin, and estradiol serum levels today.  2.  Semen analysis x 1 more.  3.  Return to the clinic in 3 weeks to discuss test results and treatment plan.  4.  Recommend avoiding "wet heat."  5.  Recommend taking a multivitamin and 500 mg of vitamin c daily in addition to the multivitamin.  6.  Please send a copy of the note to Dr. Labadie.  Thank you for the consultation.  7.  Discussed varicocele's potential impact on fertility.     CC: Lab[facility]ie    "

## 2019-09-19 ENCOUNTER — OFFICE VISIT (OUTPATIENT)
Dept: UROLOGY | Facility: CLINIC | Age: 29
End: 2019-09-19
Payer: COMMERCIAL

## 2019-09-19 ENCOUNTER — TELEPHONE (OUTPATIENT)
Dept: UROLOGY | Facility: CLINIC | Age: 29
End: 2019-09-19

## 2019-09-19 VITALS
HEART RATE: 54 BPM | WEIGHT: 249.13 LBS | SYSTOLIC BLOOD PRESSURE: 121 MMHG | BODY MASS INDEX: 36.9 KG/M2 | DIASTOLIC BLOOD PRESSURE: 77 MMHG | HEIGHT: 69 IN

## 2019-09-19 DIAGNOSIS — I86.1 VARICOCELE: ICD-10-CM

## 2019-09-19 DIAGNOSIS — E29.1 TESTICULAR FAILURE: Primary | ICD-10-CM

## 2019-09-19 DIAGNOSIS — I86.1 VARICOCELE: Primary | ICD-10-CM

## 2019-09-19 PROCEDURE — 99999 PR PBB SHADOW E&M-EST. PATIENT-LVL III: ICD-10-PCS | Mod: PBBFAC,,, | Performed by: UROLOGY

## 2019-09-19 PROCEDURE — 99999 PR PBB SHADOW E&M-EST. PATIENT-LVL III: CPT | Mod: PBBFAC,,, | Performed by: UROLOGY

## 2019-09-19 PROCEDURE — 99214 OFFICE O/P EST MOD 30 MIN: CPT | Mod: S$GLB,,, | Performed by: UROLOGY

## 2019-09-19 PROCEDURE — 99214 PR OFFICE/OUTPT VISIT, EST, LEVL IV, 30-39 MIN: ICD-10-PCS | Mod: S$GLB,,, | Performed by: UROLOGY

## 2019-09-19 NOTE — PROGRESS NOTES
"Chief Complaint:  Infertility    HPI:    Mr. Sainz is a 28 y.o.  male who has been  to his wife for the past 1 years. They have been trying to achieve a pregnancy for the past 1 years but without success. 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 He denies a history of erectile dysfunction and ejaculatory problems.    He has a history of bilateral orchiopexy for torsion at age 13.    Lab Results   Component Value Date    TOTALTESTOST 320 2019    LABLH 2.3 2019    FSH 1.40 2019    ESTRADIOL 29 2019    PROLACTIN 4.4 2019     SA 19--2.0 cc/10.5 million per cc/57%/3.5%  SA 9/3/19--2.5 cc/16 million per cc/72%/1.5%    FOR REVIEW FROM PREVIOUS:    Mr. Sainz is a 28 y.o.  male who has been  to his wife for the past 1 years. They have been trying to achieve a pregnancy for the past 1 years but without success. Gurwinder Sainz has undergone a semen analysis x 1, but I don't have the results. He denies a history of erectile dysfunction and ejaculatory problems.    He has a history of bilateral orchiopexy for torsion at age 13.    He has achieved 0 pregnancies in the past.    Patricia Soler is 27 years old. ( 89) Her menses are regular. She has not undergone prior hysterosalpingogram. She has achieved 0 prior pregnancies.  She sees Dr. Labadie.    The couple has not undergone prior intrauterine insemination procedures.    The couple has not undergone prior in-vitro fertilization procedures.    Gurwinder Sainz denies a history of exposure to harmful chemicals, toxins, and radiation.    No history of recent fevers greater than 101.5 degrees Farenheit.    No history of recent exposure to "wet heat."    No history of urological trauma.  + torsion.    No history of prostatitis, epididymitis, and orchitis.    No history of post-pubertal mumps.    There is no known family history of fertility problems.    REVIEW OF SYSTEMS:     He denies " headache, blurred vision, fever, nausea, vomiting, chills, flank discomfort, abdominal pain, bleeding per rectum, cough, SOB, recent loss of consciousness, recent mental status changes, seizures, dizziness, or upper/lower extremity weakness.    PHYSICAL EXAM:     The patient generally appears in good health, is appropriately interactive, and is in no apparent distress.     Eyes: anicteric sclerae, moist conjunctivae; no lid-lag; PERRLA     HENT: Atraumatic; oropharynx clear with moist mucous membranes and no mucosal ulcerations;normal hard and soft palate.  No evidence of lymphadenopathy.    Neck: Trachea midline.  No thyromegaly.    Musculoskeletal: No abnormal gait.    Skin: No lesions.    Mental: Cooperative with normal affect.  Is oriented to time, place, and person.    Neuro: Grossly intact.    Chest: Normal inspiratory effort.   No accessory muscles.  No audible wheezes.  Respirations symmetric on inspiration and expiration.    Heart: Regular rhythm.      Abdomen:  Soft, non-tender. No masses or organomegaly. Bladder is not palpable. No evidence of flank discomfort. No evidence of inguinal hernia.    Genitourinary: Penis is normal with no evidence of plaques or induration. Urethral meatus is normal. Scrotum is normal. Testes are descended bilaterally with no evidence of abnormal masses or tenderness. Epididymis, vas deferens, and cord structures are normal bilaterally.  Testicular volume is approximately 18 cc bilaterally. He has a L grade II varicocele.    Extremities: No cyanosis, clubbing, or edema.    IMPRESSION & PLAN:    Mr. Sainz is a 28 y.o.  male who has been  to his wife for the past 1 years. They have been trying to achieve a pregnancy for the past 1 years but without success. 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 He denies a history of erectile dysfunction and ejaculatory problems.    He has a history of bilateral orchiopexy for torsion at age  "13.    Lab Results   Component Value Date    TOTALTESTOST 320 08/29/2019    LABLH 2.3 08/29/2019    FSH 1.40 08/29/2019    ESTRADIOL 29 08/29/2019    PROLACTIN 4.4 08/29/2019     SA 8/9/19--2.0 cc/10.5 million per cc/57%/3.5%  SA 9/3/19--2.5 cc/16 million per cc/72%/1.5%    He has a L grade II varicocele.    1.  Went over the results of his SA and hormonal panel today.  2.  Discussed varicocele's potential impact on fertility. Recommend repair.  Discussed this will be more difficult due to prior orchiopexy and the prior orchiopexy can limit possible success  3.  We discussed the risks and benefits of varicocele repair.  We specifically addressed the chance of failure to improve semen parameters, bleeding, infection, pain, loss of testicle, damage to the vas.  We discussed this amongst other risks and discussed alternatives.  He was given the chance to ask questions.  Will schedule for elective varicocele repair.    4.  Recommend avoiding "wet heat."  5.  Recommend taking a multivitamin and 500 mg of vitamin c daily in addition to the multivitamin.  6.  Please send a copy of the note to Dr. Labadie.        CC: Labadie    "

## 2019-09-26 ENCOUNTER — ANESTHESIA EVENT (OUTPATIENT)
Dept: SURGERY | Facility: HOSPITAL | Age: 29
End: 2019-09-26
Payer: COMMERCIAL

## 2019-09-26 NOTE — ANESTHESIA PREPROCEDURE EVALUATION
Laura Marte, RN   Registered Nurse      Pre Admission Screening   Signed                       []Hide copied text    []Joãover for details  Anesthesia Assessment: Preoperative EQUATION     Planned Procedure: Procedure(s) (LRB):  EXCISION, VARICOCELE (Left)  Requested Anesthesia Type:General  Surgeon: Bjorn Rudd MD  Service: Urology  Known or anticipated Date of Surgery:10/15/2019     Surgeon notes: reviewed     Electronic QUestionnaire Assessment completed via nurse interview with patient.         NO AQ           Triage considerations:      The patient has no apparent active cardiac condition (No unstable coronary Syndrome such as severe unstable angina or recent [<1 month] myocardial infarction, decompensated CHF, severe valvular   disease or significant arrhythmia)     Previous anesthesia records:GETA and No problems  Airway/Jaw/Neck:  Airway Findings: Mouth Opening: Normal Tongue: Normal  General Airway Assessment: Adult  Mallampati: II  TM Distance: Normal, at least 6 cm  Jaw/Neck Findings:  Neck ROM: Normal ROM      Last PCP note: outside [facility]   Subspecialty notes: UROLOGY     Other important co-morbidities: N/A     Tests already available:  No recent tests.                            Instructions given. (See in Nurse's note)     Optimization:  Anesthesia Preop Clinic Assessment  Indicated:NOT INDICATED                Plan:    Testing:  N/A                           Patient  has previously scheduled Medical Appointment:NOT AT THIS TIME     Navigation:  Straight Line to surgery.                          No tests, anesthesia preop clinic visit, or consult required.                                                                                                                      09/26/2019  Gurwinder Sainz is a 28 y.o., male.    Anesthesia Evaluation         Review of Systems  Anesthesia Hx:  Denies Family Hx of Anesthesia complications.  Denies Personal Hx of Anesthesia complications.    Social:  Former Smoker, Social Alcohol Use    Hematology/Oncology:  Hematology Normal   Oncology Normal     EENT/Dental:EENT/Dental Normal   Cardiovascular:  Cardiovascular Normal   Denies Angina.  Functional Capacity 4 METS    Pulmonary:  Pulmonary Normal  Denies Shortness of breath.  Denies Recent URI.    Renal/:   VARIOCELE. TESTICULAR FAILURE.    Hepatic/GI:  Hepatic/GI Normal    Musculoskeletal:  Musculoskeletal Normal    Neurological:  Neurology Normal    Endocrine:  Endocrine Normal    Psych:  Psychiatric Normal           Physical Exam  General:  Obesity    Airway/Jaw/Neck:  Airway Findings: Mouth Opening: Normal Tongue: Normal  General Airway Assessment: Adult  Mallampati: III  Improves to II with phonation.  TM Distance: Normal, at least 6 cm        Eyes/Ears/Nose:  EYES/EARS/NOSE FINDINGS: Normal   Dental:  Dental Findings: In tact   Chest/Lungs:  Chest/Lungs Clear    Heart/Vascular:  Heart Findings: Normal Heart murmur: negative Vascular Findings: Normal    Abdomen:  Abdomen Findings: Normal    Musculoskeletal:  Musculoskeletal Findings: Normal   Skin:  Skin Findings: Normal    Mental Status:  Mental Status Findings: Normal        Anesthesia Plan  Type of Anesthesia, risks & benefits discussed:  Anesthesia Type:  general  Patient's Preference:   Intra-op Monitoring Plan:   Intra-op Monitoring Plan Comments:   Post Op Pain Control Plan:   Post Op Pain Control Plan Comments:   Induction:   IV  Beta Blocker:  Patient is not currently on a Beta-Blocker (No further documentation required).       Informed Consent: Patient understands risks and agrees with Anesthesia plan.  Questions answered. Anesthesia consent signed with patient.  ASA Score: 2     Day of Surgery Review of History & Physical:    H&P update referred to the surgeon.         Ready For Surgery From Anesthesia Perspective.

## 2019-09-26 NOTE — PRE ADMISSION SCREENING
Anesthesia Assessment: Preoperative EQUATION    Planned Procedure: Procedure(s) (LRB):  EXCISION, VARICOCELE (Left)  Requested Anesthesia Type:General  Surgeon: Bjorn Rudd MD  Service: Urology  Known or anticipated Date of Surgery:10/15/2019    Surgeon notes: reviewed    Electronic QUestionnaire Assessment completed via nurse interview with patient.        NO AQ        Triage considerations:     The patient has no apparent active cardiac condition (No unstable coronary Syndrome such as severe unstable angina or recent [<1 month] myocardial infarction, decompensated CHF, severe valvular   disease or significant arrhythmia)    Previous anesthesia records:GETA and No problems  Airway/Jaw/Neck:  Airway Findings: Mouth Opening: Normal Tongue: Normal  General Airway Assessment: Adult  Mallampati: II  TM Distance: Normal, at least 6 cm  Jaw/Neck Findings:  Neck ROM: Normal ROM      Last PCP note: outside [facility]   Subspecialty notes: UROLOGY    Other important co-morbidities: N/A     Tests already available:  No recent tests.            Instructions given. (See in Nurse's note)    Optimization:  Anesthesia Preop Clinic Assessment  Indicated:NOT INDICATED      Plan:    Testing:  N/A     Patient  has previously scheduled Medical Appointment:NOT AT THIS TIME    Navigation:  Straight Line to surgery.               No tests, anesthesia preop clinic visit, or consult required.

## 2019-10-08 ENCOUNTER — TELEPHONE (OUTPATIENT)
Dept: UROLOGY | Facility: CLINIC | Age: 29
End: 2019-10-08

## 2019-10-08 NOTE — TELEPHONE ENCOUNTER
----- Message from Lauren Gupta sent at 10/8/2019 12:12 PM CDT -----  Contact: pt   Pt called want to know if Dr. Rudd could do a penile adhesion at the same time of his varicocele on 10/15/19?  Please advise.. Pt call back # [phone]

## 2019-10-09 NOTE — TELEPHONE ENCOUNTER
Spoke to pt. Pt informed  would have to look at adhesion first to see if it could be corrected at the same time as his surgery. Appt. Scheduled.

## 2019-10-10 ENCOUNTER — TELEPHONE (OUTPATIENT)
Dept: UROLOGY | Facility: CLINIC | Age: 29
End: 2019-10-10

## 2019-10-10 NOTE — TELEPHONE ENCOUNTER
Pt notified  would need to assess adhesion prior to surgery. appt scheduled for pt to see . Pt aware and verbalized understanding.

## 2019-10-14 ENCOUNTER — OFFICE VISIT (OUTPATIENT)
Dept: UROLOGY | Facility: CLINIC | Age: 29
End: 2019-10-14
Payer: COMMERCIAL

## 2019-10-14 ENCOUNTER — TELEPHONE (OUTPATIENT)
Dept: UROLOGY | Facility: CLINIC | Age: 29
End: 2019-10-14

## 2019-10-14 VITALS
WEIGHT: 249.13 LBS | BODY MASS INDEX: 35.66 KG/M2 | HEART RATE: 61 BPM | DIASTOLIC BLOOD PRESSURE: 83 MMHG | HEIGHT: 70 IN | SYSTOLIC BLOOD PRESSURE: 117 MMHG

## 2019-10-14 DIAGNOSIS — E29.1 TESTICULAR FAILURE: Primary | ICD-10-CM

## 2019-10-14 DIAGNOSIS — I86.1 VARICOCELE: ICD-10-CM

## 2019-10-14 DIAGNOSIS — N47.8 PENILE ADHESION, ACQUIRED: ICD-10-CM

## 2019-10-14 PROCEDURE — 99999 PR PBB SHADOW E&M-EST. PATIENT-LVL III: ICD-10-PCS | Mod: PBBFAC,,, | Performed by: UROLOGY

## 2019-10-14 PROCEDURE — 99214 OFFICE O/P EST MOD 30 MIN: CPT | Mod: S$GLB,,, | Performed by: UROLOGY

## 2019-10-14 PROCEDURE — 99214 PR OFFICE/OUTPT VISIT, EST, LEVL IV, 30-39 MIN: ICD-10-PCS | Mod: S$GLB,,, | Performed by: UROLOGY

## 2019-10-14 PROCEDURE — 99999 PR PBB SHADOW E&M-EST. PATIENT-LVL III: CPT | Mod: PBBFAC,,, | Performed by: UROLOGY

## 2019-10-14 NOTE — TELEPHONE ENCOUNTER
Called pt to confirm arrival time of 730am for procedure on 10-15-19. Gave pt NPO instructions and gave pt opportunity to ask questions. Pt verbalized understanding.

## 2019-10-14 NOTE — PROGRESS NOTES
"Chief Complaint:  Infertility    HPI:    Mr. Sainz is a 28 y.o.  male who has been  to his wife for the past 1 years. They have been trying to achieve a pregnancy for the past 1 years but without success. 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 He denies a history of erectile dysfunction and ejaculatory problems.    He has a history of bilateral orchiopexy for torsion at age 13.    Lab Results   Component Value Date    TOTALTESTOST 320 2019    LABLH 2.3 2019    FSH 1.40 2019    ESTRADIOL 29 2019    PROLACTIN 4.4 2019     SA 19--2.0 cc/10.5 million per cc/57%/3.5%  SA 9/3/19--2.5 cc/16 million per cc/72%/1.5%    FOR REVIEW FROM PREVIOUS:    Mr. Sainz is a 28 y.o.  male who has been  to his wife for the past 1 years. They have been trying to achieve a pregnancy for the past 1 years but without success. Gurwinder Sainz has undergone a semen analysis x 1, but I don't have the results. He denies a history of erectile dysfunction and ejaculatory problems.    He has a history of bilateral orchiopexy for torsion at age 13.    He has achieved 0 pregnancies in the past.    Patricia Soler is 27 years old. ( [birth year]) Her menses are regular. She has not undergone prior hysterosalpingogram. She has achieved 0 prior pregnancies.  She sees Dr. Labadie.    The couple has not undergone prior intrauterine insemination procedures.    The couple has not undergone prior in-vitro fertilization procedures.    Gurwinder Sainz denies a history of exposure to harmful chemicals, toxins, and radiation.    No history of recent fevers greater than 101.5 degrees Farenheit.    No history of recent exposure to "wet heat."    No history of urological trauma.  + torsion.    No history of prostatitis, epididymitis, and orchitis.    No history of post-pubertal mumps.    There is no known family history of fertility problems.    REVIEW OF SYSTEMS:     He denies " headache, blurred vision, fever, nausea, vomiting, chills, flank discomfort, abdominal pain, bleeding per rectum, cough, SOB, recent loss of consciousness, recent mental status changes, seizures, dizziness, or upper/lower extremity weakness.    PHYSICAL EXAM:     The patient generally appears in good health, is appropriately interactive, and is in no apparent distress.     Eyes: anicteric sclerae, moist conjunctivae; no lid-lag; PERRLA     HENT: Atraumatic; oropharynx clear with moist mucous membranes and no mucosal ulcerations;normal hard and soft palate.  No evidence of lymphadenopathy.    Neck: Trachea midline.  No thyromegaly.    Musculoskeletal: No abnormal gait.    Skin: No lesions.    Mental: Cooperative with normal affect.  Is oriented to time, place, and person.    Neuro: Grossly intact.    Chest: Normal inspiratory effort.   No accessory muscles.  No audible wheezes.  Respirations symmetric on inspiration and expiration.    Heart: Regular rhythm.      Abdomen:  Soft, non-tender. No masses or organomegaly. Bladder is not palpable. No evidence of flank discomfort. No evidence of inguinal hernia.    Genitourinary: Penis is normal with no evidence of plaques or induration. Urethral meatus is normal. Scrotum is normal. Testes are descended bilaterally with no evidence of abnormal masses or tenderness. Epididymis, vas deferens, and cord structures are normal bilaterally.  Testicular volume is approximately 18 cc bilaterally. He has a L grade II varicocele.    Extremities: No cyanosis, clubbing, or edema.    IMPRESSION & PLAN:    Mr. Sainz is a 28 y.o.  male who has been  to his wife for the past 1 years. They have been trying to achieve a pregnancy for the past 1 years but without success. 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 He denies a history of erectile dysfunction and ejaculatory problems.    He has a history of bilateral orchiopexy for torsion at age  "13.    Lab Results   Component Value Date    TOTALTESTOST 320 08/29/2019    LABLH 2.3 08/29/2019    FSH 1.40 08/29/2019    ESTRADIOL 29 08/29/2019    PROLACTIN 4.4 08/29/2019     SA 8/9/19--2.0 cc/10.5 million per cc/57%/3.5%  SA 9/3/19--2.5 cc/16 million per cc/72%/1.5%    He has a L grade II varicocele.    1.  Went over the results of his SA and hormonal panel today.  2.  Discussed varicocele's potential impact on fertility. Recommend repair.  Discussed this will be more difficult due to prior orchiopexy and the prior orchiopexy can limit possible success  3.  We discussed the risks and benefits of varicocele repair.  We specifically addressed the chance of failure to improve semen parameters, bleeding, infection, pain, loss of testicle, damage to the vas.  We discussed this amongst other risks and discussed alternatives.  He was given the chance to ask questions.  Will schedule for elective varicocele repair.    4.  Recommend avoiding "wet heat."  5.  Recommend taking a multivitamin and 500 mg of vitamin c daily in addition to the multivitamin.  6.  Please send a copy of the note to Dr. Labadie.    7.  He'd like his penile adhesion cut as well.  I  have explained the risk, benefits, and alternatives of the procedure in detail. The patient voices understanding and all questions have been answered. The patient agrees to proceed as planned.        CC: Labadie    "

## 2019-10-14 NOTE — H&P (VIEW-ONLY)
"Chief Complaint:  Infertility    HPI:    Mr. Sainz is a 28 y.o.  male who has been  to his wife for the past 1 years. They have been trying to achieve a pregnancy for the past 1 years but without success. 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 He denies a history of erectile dysfunction and ejaculatory problems.    He has a history of bilateral orchiopexy for torsion at age 13.    Lab Results   Component Value Date    TOTALTESTOST 320 2019    LABLH 2.3 2019    FSH 1.40 2019    ESTRADIOL 29 2019    PROLACTIN 4.4 2019     SA 19--2.0 cc/10.5 million per cc/57%/3.5%  SA 9/3/19--2.5 cc/16 million per cc/72%/1.5%    FOR REVIEW FROM PREVIOUS:    Mr. Sainz is a 28 y.o.  male who has been  to his wife for the past 1 years. They have been trying to achieve a pregnancy for the past 1 years but without success. Gurwinder Sainz has undergone a semen analysis x 1, but I don't have the results. He denies a history of erectile dysfunction and ejaculatory problems.    He has a history of bilateral orchiopexy for torsion at age 13.    He has achieved 0 pregnancies in the past.    aPtricia Soler is 27 years old. ( 89) Her menses are regular. She has not undergone prior hysterosalpingogram. She has achieved 0 prior pregnancies.  She sees Dr. Labadie.    The couple has not undergone prior intrauterine insemination procedures.    The couple has not undergone prior in-vitro fertilization procedures.    Gurwinder Sainz denies a history of exposure to harmful chemicals, toxins, and radiation.    No history of recent fevers greater than 101.5 degrees Farenheit.    No history of recent exposure to "wet heat."    No history of urological trauma.  + torsion.    No history of prostatitis, epididymitis, and orchitis.    No history of post-pubertal mumps.    There is no known family history of fertility problems.    REVIEW OF SYSTEMS:     He denies " headache, blurred vision, fever, nausea, vomiting, chills, flank discomfort, abdominal pain, bleeding per rectum, cough, SOB, recent loss of consciousness, recent mental status changes, seizures, dizziness, or upper/lower extremity weakness.    PHYSICAL EXAM:     The patient generally appears in good health, is appropriately interactive, and is in no apparent distress.     Eyes: anicteric sclerae, moist conjunctivae; no lid-lag; PERRLA     HENT: Atraumatic; oropharynx clear with moist mucous membranes and no mucosal ulcerations;normal hard and soft palate.  No evidence of lymphadenopathy.    Neck: Trachea midline.  No thyromegaly.    Musculoskeletal: No abnormal gait.    Skin: No lesions.    Mental: Cooperative with normal affect.  Is oriented to time, place, and person.    Neuro: Grossly intact.    Chest: Normal inspiratory effort.   No accessory muscles.  No audible wheezes.  Respirations symmetric on inspiration and expiration.    Heart: Regular rhythm.      Abdomen:  Soft, non-tender. No masses or organomegaly. Bladder is not palpable. No evidence of flank discomfort. No evidence of inguinal hernia.    Genitourinary: Penis is normal with no evidence of plaques or induration. Urethral meatus is normal. Scrotum is normal. Testes are descended bilaterally with no evidence of abnormal masses or tenderness. Epididymis, vas deferens, and cord structures are normal bilaterally.  Testicular volume is approximately 18 cc bilaterally. He has a L grade II varicocele.    Extremities: No cyanosis, clubbing, or edema.    IMPRESSION & PLAN:    Mr. Sainz is a 28 y.o.  male who has been  to his wife for the past 1 years. They have been trying to achieve a pregnancy for the past 1 years but without success. 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 He denies a history of erectile dysfunction and ejaculatory problems.    He has a history of bilateral orchiopexy for torsion at age  "13.    Lab Results   Component Value Date    TOTALTESTOST 320 08/29/2019    LABLH 2.3 08/29/2019    FSH 1.40 08/29/2019    ESTRADIOL 29 08/29/2019    PROLACTIN 4.4 08/29/2019     SA 8/9/19--2.0 cc/10.5 million per cc/57%/3.5%  SA 9/3/19--2.5 cc/16 million per cc/72%/1.5%    He has a L grade II varicocele.    1.  Went over the results of his SA and hormonal panel today.  2.  Discussed varicocele's potential impact on fertility. Recommend repair.  Discussed this will be more difficult due to prior orchiopexy and the prior orchiopexy can limit possible success  3.  We discussed the risks and benefits of varicocele repair.  We specifically addressed the chance of failure to improve semen parameters, bleeding, infection, pain, loss of testicle, damage to the vas.  We discussed this amongst other risks and discussed alternatives.  He was given the chance to ask questions.  Will schedule for elective varicocele repair.    4.  Recommend avoiding "wet heat."  5.  Recommend taking a multivitamin and 500 mg of vitamin c daily in addition to the multivitamin.  6.  Please send a copy of the note to Dr. Labadie.    7.  He'd like his penile adhesion cut as well.  I  have explained the risk, benefits, and alternatives of the procedure in detail. The patient voices understanding and all questions have been answered. The patient agrees to proceed as planned.        CC: Labadie    "

## 2019-10-15 ENCOUNTER — HOSPITAL ENCOUNTER (OUTPATIENT)
Facility: HOSPITAL | Age: 29
Discharge: HOME | End: 2019-10-15
Attending: UROLOGY | Admitting: UROLOGY
Payer: COMMERCIAL

## 2019-10-15 ENCOUNTER — ANESTHESIA (OUTPATIENT)
Dept: SURGERY | Facility: HOSPITAL | Age: 29
End: 2019-10-15
Payer: COMMERCIAL

## 2019-10-15 VITALS
HEIGHT: 69 IN | SYSTOLIC BLOOD PRESSURE: 115 MMHG | OXYGEN SATURATION: 98 % | TEMPERATURE: 97 F | RESPIRATION RATE: 18 BRPM | HEART RATE: 63 BPM | WEIGHT: 250 LBS | BODY MASS INDEX: 37.03 KG/M2 | DIASTOLIC BLOOD PRESSURE: 61 MMHG

## 2019-10-15 DIAGNOSIS — N48.89 PENILE SKIN BRIDGE: ICD-10-CM

## 2019-10-15 DIAGNOSIS — I86.1 VARICOCELE: Primary | ICD-10-CM

## 2019-10-15 PROCEDURE — 25000003 PHARM REV CODE 250: Performed by: UROLOGY

## 2019-10-15 PROCEDURE — 54162 LYSIS PENIL CIRCUMIC LESION: CPT | Mod: 51,,, | Performed by: UROLOGY

## 2019-10-15 PROCEDURE — 71000033 HC RECOVERY, INTIAL HOUR: Performed by: UROLOGY

## 2019-10-15 PROCEDURE — 76998 US GUIDE INTRAOP: CPT | Mod: 26,,, | Performed by: UROLOGY

## 2019-10-15 PROCEDURE — S0028 INJECTION, FAMOTIDINE, 20 MG: HCPCS | Performed by: NURSE ANESTHETIST, CERTIFIED REGISTERED

## 2019-10-15 PROCEDURE — 54162 PR LYSIS/EXCIS,PENILE POSTCIRCUM ADHESIONS: ICD-10-PCS | Mod: 51,,, | Performed by: UROLOGY

## 2019-10-15 PROCEDURE — 76998 PR  ULTRASONIC GUIDANCE, INTRAOPERATIVE: ICD-10-PCS | Mod: 26,,, | Performed by: UROLOGY

## 2019-10-15 PROCEDURE — 55530 PR EXCISE VARICOCELE: ICD-10-PCS | Mod: LT,,, | Performed by: UROLOGY

## 2019-10-15 PROCEDURE — 63600175 PHARM REV CODE 636 W HCPCS: Performed by: ANESTHESIOLOGY

## 2019-10-15 PROCEDURE — 63600175 PHARM REV CODE 636 W HCPCS: Performed by: NURSE ANESTHETIST, CERTIFIED REGISTERED

## 2019-10-15 PROCEDURE — 37000008 HC ANESTHESIA 1ST 15 MINUTES: Performed by: UROLOGY

## 2019-10-15 PROCEDURE — 63600175 PHARM REV CODE 636 W HCPCS: Performed by: STUDENT IN AN ORGANIZED HEALTH CARE EDUCATION/TRAINING PROGRAM

## 2019-10-15 PROCEDURE — 94761 N-INVAS EAR/PLS OXIMETRY MLT: CPT

## 2019-10-15 PROCEDURE — D9220A PRA ANESTHESIA: Mod: ,,, | Performed by: ANESTHESIOLOGY

## 2019-10-15 PROCEDURE — 36000707: Performed by: UROLOGY

## 2019-10-15 PROCEDURE — 63600175 PHARM REV CODE 636 W HCPCS: Performed by: UROLOGY

## 2019-10-15 PROCEDURE — 71000015 HC POSTOP RECOV 1ST HR: Performed by: UROLOGY

## 2019-10-15 PROCEDURE — 36000706: Performed by: UROLOGY

## 2019-10-15 PROCEDURE — 37000009 HC ANESTHESIA EA ADD 15 MINS: Performed by: UROLOGY

## 2019-10-15 PROCEDURE — 55530 REVISE SPERMATIC CORD VEINS: CPT | Mod: LT,,, | Performed by: UROLOGY

## 2019-10-15 PROCEDURE — 25000003 PHARM REV CODE 250: Performed by: NURSE ANESTHETIST, CERTIFIED REGISTERED

## 2019-10-15 PROCEDURE — 71000039 HC RECOVERY, EACH ADD'L HOUR: Performed by: UROLOGY

## 2019-10-15 PROCEDURE — D9220A PRA ANESTHESIA: ICD-10-PCS | Mod: ,,, | Performed by: ANESTHESIOLOGY

## 2019-10-15 RX ORDER — HYDROCODONE BITARTRATE AND ACETAMINOPHEN 5; 325 MG/1; MG/1
1 TABLET ORAL EVERY 6 HOURS PRN
Qty: 10 TABLET | Refills: 0 | Status: SHIPPED | OUTPATIENT
Start: 2019-10-15 | End: 2019-10-25

## 2019-10-15 RX ORDER — ROCURONIUM BROMIDE 10 MG/ML
INJECTION, SOLUTION INTRAVENOUS
Status: DISCONTINUED | OUTPATIENT
Start: 2019-10-15 | End: 2019-10-15

## 2019-10-15 RX ORDER — LABETALOL HYDROCHLORIDE 5 MG/ML
INJECTION, SOLUTION INTRAVENOUS
Status: DISCONTINUED | OUTPATIENT
Start: 2019-10-15 | End: 2019-10-15

## 2019-10-15 RX ORDER — DEXMEDETOMIDINE HYDROCHLORIDE 100 UG/ML
INJECTION, SOLUTION INTRAVENOUS
Status: DISCONTINUED | OUTPATIENT
Start: 2019-10-15 | End: 2019-10-15

## 2019-10-15 RX ORDER — FENTANYL CITRATE 50 UG/ML
INJECTION, SOLUTION INTRAMUSCULAR; INTRAVENOUS
Status: DISCONTINUED | OUTPATIENT
Start: 2019-10-15 | End: 2019-10-15

## 2019-10-15 RX ORDER — SODIUM CHLORIDE 9 MG/ML
INJECTION, SOLUTION INTRAVENOUS CONTINUOUS PRN
Status: DISCONTINUED | OUTPATIENT
Start: 2019-10-15 | End: 2019-10-15

## 2019-10-15 RX ORDER — LORAZEPAM 2 MG/ML
0.25 INJECTION INTRAMUSCULAR ONCE AS NEEDED
Status: DISCONTINUED | OUTPATIENT
Start: 2019-10-15 | End: 2019-10-15 | Stop reason: HOSPADM

## 2019-10-15 RX ORDER — MIDAZOLAM HYDROCHLORIDE 1 MG/ML
INJECTION, SOLUTION INTRAMUSCULAR; INTRAVENOUS
Status: DISCONTINUED | OUTPATIENT
Start: 2019-10-15 | End: 2019-10-15

## 2019-10-15 RX ORDER — PHENYLEPHRINE HYDROCHLORIDE 10 MG/ML
INJECTION INTRAVENOUS
Status: DISCONTINUED | OUTPATIENT
Start: 2019-10-15 | End: 2019-10-15

## 2019-10-15 RX ORDER — HYDROCODONE BITARTRATE AND ACETAMINOPHEN 5; 325 MG/1; MG/1
1 TABLET ORAL EVERY 6 HOURS PRN
Status: DISCONTINUED | OUTPATIENT
Start: 2019-10-15 | End: 2019-10-15 | Stop reason: HOSPADM

## 2019-10-15 RX ORDER — PROPOFOL 10 MG/ML
VIAL (ML) INTRAVENOUS
Status: DISCONTINUED | OUTPATIENT
Start: 2019-10-15 | End: 2019-10-15

## 2019-10-15 RX ORDER — ONDANSETRON 2 MG/ML
INJECTION INTRAMUSCULAR; INTRAVENOUS
Status: DISCONTINUED | OUTPATIENT
Start: 2019-10-15 | End: 2019-10-15

## 2019-10-15 RX ORDER — HYDROMORPHONE HYDROCHLORIDE 1 MG/ML
0.2 INJECTION, SOLUTION INTRAMUSCULAR; INTRAVENOUS; SUBCUTANEOUS EVERY 5 MIN PRN
Status: DISCONTINUED | OUTPATIENT
Start: 2019-10-15 | End: 2019-10-15 | Stop reason: HOSPADM

## 2019-10-15 RX ORDER — DEXAMETHASONE SODIUM PHOSPHATE 4 MG/ML
INJECTION, SOLUTION INTRA-ARTICULAR; INTRALESIONAL; INTRAMUSCULAR; INTRAVENOUS; SOFT TISSUE
Status: DISCONTINUED | OUTPATIENT
Start: 2019-10-15 | End: 2019-10-15

## 2019-10-15 RX ORDER — LIDOCAINE HCL/PF 100 MG/5ML
SYRINGE (ML) INTRAVENOUS
Status: DISCONTINUED | OUTPATIENT
Start: 2019-10-15 | End: 2019-10-15

## 2019-10-15 RX ORDER — FAMOTIDINE 10 MG/ML
INJECTION INTRAVENOUS
Status: DISCONTINUED | OUTPATIENT
Start: 2019-10-15 | End: 2019-10-15

## 2019-10-15 RX ORDER — SODIUM CHLORIDE 9 MG/ML
INJECTION, SOLUTION INTRAVENOUS CONTINUOUS
Status: DISCONTINUED | OUTPATIENT
Start: 2019-10-15 | End: 2019-10-15 | Stop reason: HOSPADM

## 2019-10-15 RX ORDER — ONDANSETRON 2 MG/ML
4 INJECTION INTRAMUSCULAR; INTRAVENOUS ONCE
Status: COMPLETED | OUTPATIENT
Start: 2019-10-15 | End: 2019-10-15

## 2019-10-15 RX ORDER — HYDROCODONE BITARTRATE AND ACETAMINOPHEN 5; 325 MG/1; MG/1
TABLET ORAL
Status: DISCONTINUED
Start: 2019-10-15 | End: 2019-10-15 | Stop reason: HOSPADM

## 2019-10-15 RX ORDER — NEOSTIGMINE METHYLSULFATE 0.5 MG/ML
INJECTION, SOLUTION INTRAVENOUS
Status: DISCONTINUED | OUTPATIENT
Start: 2019-10-15 | End: 2019-10-15

## 2019-10-15 RX ORDER — GLYCOPYRROLATE 0.2 MG/ML
INJECTION INTRAMUSCULAR; INTRAVENOUS
Status: DISCONTINUED | OUTPATIENT
Start: 2019-10-15 | End: 2019-10-15

## 2019-10-15 RX ORDER — LIDOCAINE HYDROCHLORIDE 10 MG/ML
1 INJECTION, SOLUTION EPIDURAL; INFILTRATION; INTRACAUDAL; PERINEURAL ONCE
Status: DISCONTINUED | OUTPATIENT
Start: 2019-10-15 | End: 2019-10-15 | Stop reason: HOSPADM

## 2019-10-15 RX ORDER — MEPERIDINE HYDROCHLORIDE 50 MG/ML
12.5 INJECTION INTRAMUSCULAR; INTRAVENOUS; SUBCUTANEOUS ONCE AS NEEDED
Status: DISCONTINUED | OUTPATIENT
Start: 2019-10-15 | End: 2019-10-15 | Stop reason: HOSPADM

## 2019-10-15 RX ORDER — CEFAZOLIN SODIUM 1 G/3ML
2 INJECTION, POWDER, FOR SOLUTION INTRAMUSCULAR; INTRAVENOUS
Status: COMPLETED | OUTPATIENT
Start: 2019-10-15 | End: 2019-10-15

## 2019-10-15 RX ORDER — SUCCINYLCHOLINE CHLORIDE 20 MG/ML
INJECTION INTRAMUSCULAR; INTRAVENOUS
Status: DISCONTINUED | OUTPATIENT
Start: 2019-10-15 | End: 2019-10-15

## 2019-10-15 RX ADMIN — DEXAMETHASONE SODIUM PHOSPHATE 8 MG: 4 INJECTION, SOLUTION INTRAMUSCULAR; INTRAVENOUS at 09:10

## 2019-10-15 RX ADMIN — FENTANYL CITRATE 50 MCG: 50 INJECTION, SOLUTION INTRAMUSCULAR; INTRAVENOUS at 10:10

## 2019-10-15 RX ADMIN — SODIUM CHLORIDE: 0.9 INJECTION, SOLUTION INTRAVENOUS at 09:10

## 2019-10-15 RX ADMIN — SODIUM CHLORIDE, SODIUM GLUCONATE, SODIUM ACETATE, POTASSIUM CHLORIDE, MAGNESIUM CHLORIDE, SODIUM PHOSPHATE, DIBASIC, AND POTASSIUM PHOSPHATE: .53; .5; .37; .037; .03; .012; .00082 INJECTION, SOLUTION INTRAVENOUS at 10:10

## 2019-10-15 RX ADMIN — SODIUM CHLORIDE: 0.9 INJECTION, SOLUTION INTRAVENOUS at 08:10

## 2019-10-15 RX ADMIN — LIDOCAINE HYDROCHLORIDE 100 MG: 20 INJECTION, SOLUTION INTRAVENOUS at 09:10

## 2019-10-15 RX ADMIN — CEFAZOLIN 2 G: 330 INJECTION, POWDER, FOR SOLUTION INTRAMUSCULAR; INTRAVENOUS at 09:10

## 2019-10-15 RX ADMIN — FENTANYL CITRATE 100 MCG: 50 INJECTION, SOLUTION INTRAMUSCULAR; INTRAVENOUS at 09:10

## 2019-10-15 RX ADMIN — ONDANSETRON 4 MG: 2 INJECTION INTRAMUSCULAR; INTRAVENOUS at 08:10

## 2019-10-15 RX ADMIN — HYDROCODONE BITARTRATE AND ACETAMINOPHEN 1 TABLET: 5; 325 TABLET ORAL at 12:10

## 2019-10-15 RX ADMIN — HYDROMORPHONE HYDROCHLORIDE 0.2 MG: 1 INJECTION, SOLUTION INTRAMUSCULAR; INTRAVENOUS; SUBCUTANEOUS at 12:10

## 2019-10-15 RX ADMIN — DEXMEDETOMIDINE HYDROCHLORIDE 40 MCG: 100 INJECTION, SOLUTION, CONCENTRATE INTRAVENOUS at 11:10

## 2019-10-15 RX ADMIN — FAMOTIDINE 20 MG: 10 INJECTION, SOLUTION INTRAVENOUS at 09:10

## 2019-10-15 RX ADMIN — ROCURONIUM BROMIDE 10 MG: 10 INJECTION, SOLUTION INTRAVENOUS at 09:10

## 2019-10-15 RX ADMIN — PHENYLEPHRINE HYDROCHLORIDE 100 MCG: 10 INJECTION INTRAVENOUS at 10:10

## 2019-10-15 RX ADMIN — NEOSTIGMINE METHYLSULFATE 5 MG: 0.5 INJECTION INTRAVENOUS at 11:10

## 2019-10-15 RX ADMIN — GLYCOPYRROLATE 0.8 MG: 0.2 INJECTION, SOLUTION INTRAMUSCULAR; INTRAVENOUS at 11:10

## 2019-10-15 RX ADMIN — GLYCOPYRROLATE 0.2 MG: 0.2 INJECTION, SOLUTION INTRAMUSCULAR; INTRAVENOUS at 10:10

## 2019-10-15 RX ADMIN — PROPOFOL 200 MG: 10 INJECTION, EMULSION INTRAVENOUS at 09:10

## 2019-10-15 RX ADMIN — LABETALOL HYDROCHLORIDE 5 MG: 5 INJECTION, SOLUTION INTRAVENOUS at 10:10

## 2019-10-15 RX ADMIN — ROCURONIUM BROMIDE 20 MG: 10 INJECTION, SOLUTION INTRAVENOUS at 10:10

## 2019-10-15 RX ADMIN — ROCURONIUM BROMIDE 40 MG: 10 INJECTION, SOLUTION INTRAVENOUS at 09:10

## 2019-10-15 RX ADMIN — PROPOFOL 150 MG: 10 INJECTION, EMULSION INTRAVENOUS at 09:10

## 2019-10-15 RX ADMIN — PROPOFOL 50 MG: 10 INJECTION, EMULSION INTRAVENOUS at 09:10

## 2019-10-15 RX ADMIN — SUCCINYLCHOLINE CHLORIDE 200 MG: 20 INJECTION, SOLUTION INTRAMUSCULAR; INTRAVENOUS at 09:10

## 2019-10-15 RX ADMIN — MIDAZOLAM HYDROCHLORIDE 2 MG: 1 INJECTION, SOLUTION INTRAMUSCULAR; INTRAVENOUS at 09:10

## 2019-10-15 RX ADMIN — PROPOFOL 50 MG: 10 INJECTION, EMULSION INTRAVENOUS at 10:10

## 2019-10-15 RX ADMIN — ONDANSETRON 4 MG: 2 INJECTION INTRAMUSCULAR; INTRAVENOUS at 11:10

## 2019-10-15 NOTE — ANESTHESIA POSTPROCEDURE EVALUATION
Anesthesia Post Evaluation    Patient: Gurwinder Sainz    Procedure(s) Performed: Procedure(s) (LRB):  EXCISION, VARICOCELE (Left)  LYSIS, ADHESIONS, PENIS (N/A)    Final Anesthesia Type: general  Patient location during evaluation: PACU  Patient participation: Yes- Able to Participate  Level of consciousness: awake and alert  Post-procedure vital signs: reviewed and stable  Pain management: adequate  Airway patency: patent  PONV status at discharge: No PONV  Anesthetic complications: no      Cardiovascular status: blood pressure returned to baseline  Respiratory status: spontaneous ventilation and room air  Hydration status: euvolemic  Follow-up not needed.          Vitals Value Taken Time   /79 10/15/2019 12:01 PM   Temp 36 °C (96.8 °F) 10/15/2019 11:18 AM   Pulse 56 10/15/2019 12:15 PM   Resp 26 10/15/2019 12:15 PM   SpO2 95 % 10/15/2019 12:15 PM   Vitals shown include unvalidated device data.      No case tracking events are documented in the log.      Pain/Hue Score: Pain Rating Prior to Med Admin: 6 (10/15/2019 12:07 PM)  Hue Score: 9 (10/15/2019 11:45 AM)

## 2019-10-15 NOTE — TRANSFER OF CARE
"Anesthesia Transfer of Care Note    Patient: Gurwinder Sainz    Procedure(s) Performed: Procedure(s) (LRB):  EXCISION, VARICOCELE (Left)  LYSIS, ADHESIONS, PENIS (N/A)    Patient location: PACU    Anesthesia Type: general    Transport from OR: Transported from OR on 6-10 L/min O2 by face mask with adequate spontaneous ventilation    Post pain: adequate analgesia    Post assessment: no apparent anesthetic complications    Post vital signs: stable    Level of consciousness: sedated    Nausea/Vomiting: no nausea/vomiting    Complications: none    Transfer of care protocol was followed      Last vitals:   Visit Vitals  /72 (BP Location: Left arm, Patient Position: Lying)   Pulse (!) 52   Temp 36.7 °C (98 °F) (Oral)   Resp 18   Ht 5' 9" (1.753 m)   Wt 113.4 kg (250 lb)   SpO2 96%   BMI 36.92 kg/m²     "

## 2019-10-15 NOTE — PLAN OF CARE
Discharge instructions and prescription given to patient and spouse. Verbalized understanding. Patient stable, tolerating fluids. No complaints at this time. Voided painlessly.  Patient adequate for discharge.

## 2019-10-15 NOTE — OP NOTE
[facility]  Operative Note    Date: 10/15/2019    Pre-Op Diagnosis: left varicocele, penile skin bridge    Patient Active Problem List   Diagnosis    Testicular failure    Varicocele    Penile skin bridge       Post-Op Diagnosis: same    Procedure(s) Performed:   left microscopic varicocelectomy  Intraoperative ultrasound  Lysis of post circumcision penile skin bridge    Specimen(s): none    Staff Surgeon: Bjorn Rudd MD    Assistant Surgeon: Eben Sheikh MD    Anesthesia: General endotracheal anesthesia    Indications: Gurwinder Sainz is a 28 y.o. male who was found to have a left varicocele. After discussion with the patient, he has elected to undergo surgical correction of his varicocele. 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 He would like this excised.    Findings:  Left grade 2 varicocele, two left testicular arteries.   Small dorsal penile skin bridge excised.     Estimated Blood Loss: <5mL    Drains: none    Procedure in Detail:  After informed consent was obtained, the patient was transferred to the operating room and placed in the supine position.  Anesthesia was administered. He was shaved, prepped and draped in the usual sterile fashion. A marking pen was used to sharif the skin over the subinguinal area over the left spermatic cord. This sharif was incised with a 15 blade, creating a transverse skin incision along Lola's lines measuring approximately 3 cm.  A hemostat was used to elevate the subcutaneous tissues.  Bovie cautery was used to dissect down through Camper's and Narendra's fascia. Blunt dissection was then used to dissect down to the fascia. The spermatic cord was identified and with blunt dissection was freed circumferentially. Cremasteric fibers were released from the cord structures. A penrose drain was placed underneath the spermatic cord, and the drain was secured to the drapes in both directions using hemostats.      The operating Leica microscope was brought into the operative field.  The vas deferens was identified.  The surgeon's hand was used to compress the vas deferens and provide traction throughout the procedure.  Fabián forceps were used to dissect away the layers of the spermatic cord fascia.  In this fashion, the internal spermatic veins were identified and dissected using Rodolfo hemostats and Fabián forceps. The veins were ligated using 3-0 silk ties. There were two separate testicular arteries were identified visually as well as with Doppler ultrasound. These were preserved throughout the case and verified each time before tying off a vein. Vasal vessels and lymphatics were also preserved. Careful inspection of the cord showed no other veins requiring ligation.    Hemostasis was confirmed, the penrose was removed, and the cord was placed back into its normal anatomic position.     We then addressed the patient's penile skin bridge. He had a 4mm skin bridge at the dorsolateral aspect of his glans on the right side. A hemostat was advanced under the skin bridge and the skin was clamped. The hemostat was removed and the skin bridge was cut with Metzenbaum scissors. Excess skin was excised. A figure of eight suture was placed on the penile shaft skin using a 4-0 Monocryl. A simple interrupted suture was placed on the glans side using 4-0 Monocryl. Hemostasis was achieved.      A 3-0 Vicryl suture was used in a running fashion to close the deep dermal layer of the inguinal incision. 4-0 monocryl running subcuticular suture was used to close the skin. Sterile dressing was applied using steri strips and island dressing. The patient was awakened and transferred to the recovery room in stable condition.    Post Operative Plan  The patient will be discharged home today.  He is to refrain from any heavy lifting or exercise for 2 weeks time.  He will follow up with Dr. Rudd in 4 weeks time.    Eben Sheikh MD

## 2019-10-15 NOTE — DISCHARGE SUMMARY
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       Discharged Condition: good    Hospital Course: Patient was admitted for left varicocelectomy and lysis of penile skin bridge and tolerated the procedure well with no complications. The patient was discharged home in good condition on the same day.       Final Diagnoses: Same as principal problem.    Disposition: Home or Self Care    Follow up/Patient Instructions:    Medications:  Reconciled Home Medications:   Current Discharge Medication List      START taking these medications    Details   HYDROcodone-acetaminophen (NORCO) 5-325 mg per tablet Take 1 tablet by mouth every 6 (six) hours as needed.  Qty: 10 tablet, Refills: 0           Discharge Procedure Orders   Call MD for:  persistent nausea and vomiting or diarrhea     Call MD for:  severe uncontrolled pain     Call MD for:  persistent dizziness, light-headedness, or visual disturbances     Call MD for:   Order Comments: Temperature > 101F     Follow-up Information     Bjorn Rudd MD In 4 weeks.    Specialty:  Urology  Contact information:  [address] [place] [address] [id number] [phone]                   Discharge Procedure Orders (must include Diet, Follow-up, Activity):   Discharge Procedure Orders (must include Diet, Follow-up, Activity)   Call MD for:  persistent nausea and vomiting or diarrhea     Call MD for:  severe uncontrolled pain     Call MD for:  persistent dizziness, light-headedness, or visual disturbances     Call MD for:   Order Comments: Temperature > 101F

## 2019-10-15 NOTE — DISCHARGE INSTRUCTIONS
OK to shower after 48 hours.   Use bacitracin ointment 2-3x per day on area of previous skin bridge on penis. Do this for about a week.  This will help with lubrication of the area as well as inhibit the two skin edges from healing back together.   No heavy lifting for 2 weeks. (Nothing over 10 lbs)

## 2019-10-15 NOTE — INTERVAL H&P NOTE
The patient has been examined and the H&P has been reviewed:    I concur with the findings and no changes have occurred since H&P was written.     Anesthesia/Surgery risks, benefits and alternative options discussed and understood by patient/family.          Active Hospital Problems    Diagnosis  POA    Varicocele [I86.1]  Yes      Resolved Hospital Problems   No resolved problems to display.

## 2019-10-28 ENCOUNTER — TELEPHONE (OUTPATIENT)
Dept: UROLOGY | Facility: CLINIC | Age: 29
End: 2019-10-28

## 2019-10-28 NOTE — TELEPHONE ENCOUNTER
Appointment scheduled for pt for eval of incision, raised area. Pt thinks may be a hernia. Pt reports it is not painful, but uncomfortable, and he is concerned appt scheduled for 8am. Pt verbalized understanding.

## 2019-10-28 NOTE — TELEPHONE ENCOUNTER
----- Message from Jaqueline Carrillo sent at 10/28/2019  8:16 AM CDT -----  Contact: pt [phone]  Pt had surgery about two weeks ago and pt say is has a hernia right under neath it.  It is pressing on the incision.

## 2019-10-29 ENCOUNTER — HOSPITAL ENCOUNTER (OUTPATIENT)
Dept: RADIOLOGY | Facility: HOSPITAL | Age: 29
Discharge: HOME | End: 2019-10-29
Attending: NURSE PRACTITIONER
Payer: COMMERCIAL

## 2019-10-29 ENCOUNTER — TELEPHONE (OUTPATIENT)
Dept: UROLOGY | Facility: CLINIC | Age: 29
End: 2019-10-29

## 2019-10-29 ENCOUNTER — OFFICE VISIT (OUTPATIENT)
Dept: UROLOGY | Facility: CLINIC | Age: 29
End: 2019-10-29
Payer: COMMERCIAL

## 2019-10-29 VITALS
HEART RATE: 52 BPM | SYSTOLIC BLOOD PRESSURE: 123 MMHG | HEIGHT: 69 IN | DIASTOLIC BLOOD PRESSURE: 86 MMHG | BODY MASS INDEX: 37.03 KG/M2 | WEIGHT: 250 LBS

## 2019-10-29 DIAGNOSIS — Z98.890 S/P SCROTAL VARICOCELECTOMY: ICD-10-CM

## 2019-10-29 DIAGNOSIS — R19.04 LEFT LOWER QUADRANT ABDOMINAL SWELLING, MASS AND LUMP: Primary | ICD-10-CM

## 2019-10-29 DIAGNOSIS — R19.04 LEFT LOWER QUADRANT ABDOMINAL SWELLING, MASS AND LUMP: ICD-10-CM

## 2019-10-29 DIAGNOSIS — Z86.79 S/P SCROTAL VARICOCELECTOMY: ICD-10-CM

## 2019-10-29 DIAGNOSIS — Z98.890 POST-OPERATIVE STATE: ICD-10-CM

## 2019-10-29 DIAGNOSIS — N50.812 PAIN IN LEFT TESTICLE: ICD-10-CM

## 2019-10-29 PROCEDURE — 99999 PR PBB SHADOW E&M-EST. PATIENT-LVL III: ICD-10-PCS | Mod: PBBFAC,,, | Performed by: NURSE PRACTITIONER

## 2019-10-29 PROCEDURE — 99999 PR PBB SHADOW E&M-EST. PATIENT-LVL III: CPT | Mod: PBBFAC,,, | Performed by: NURSE PRACTITIONER

## 2019-10-29 PROCEDURE — 99024 POSTOP FOLLOW-UP VISIT: CPT | Mod: S$GLB,,, | Performed by: NURSE PRACTITIONER

## 2019-10-29 PROCEDURE — 76705 ECHO EXAM OF ABDOMEN: CPT | Mod: 26,,, | Performed by: RADIOLOGY

## 2019-10-29 PROCEDURE — 76705 US ABDOMEN LIMITED: ICD-10-PCS | Mod: 26,,, | Performed by: RADIOLOGY

## 2019-10-29 PROCEDURE — 99024 PR POST-OP FOLLOW-UP VISIT: ICD-10-PCS | Mod: S$GLB,,, | Performed by: NURSE PRACTITIONER

## 2019-10-29 PROCEDURE — 76705 ECHO EXAM OF ABDOMEN: CPT | Mod: TC

## 2019-10-29 RX ORDER — OXAPROZIN 600 MG/1
600 TABLET, FILM COATED ORAL EVERY 12 HOURS
Qty: 60 TABLET | Refills: 1 | Status: SHIPPED | OUTPATIENT
Start: 2019-10-29 | End: 2019-10-29 | Stop reason: SDUPTHER

## 2019-10-29 RX ORDER — OXAPROZIN 600 MG/1
600 TABLET, FILM COATED ORAL EVERY 12 HOURS
Qty: 30 TABLET | Refills: 1 | Status: SHIPPED | OUTPATIENT
Start: 2019-10-29 | End: 2019-11-13

## 2019-10-29 NOTE — TELEPHONE ENCOUNTER
Left message that area of concern is nothing to be concerned about.  Definitely not a hernia.  This fluid collection with resolve on its on.  Can call back with any concerns.

## 2019-10-29 NOTE — PROGRESS NOTES
Subjective:       Patient ID: Gurwinder Sainz is a 29 y.o. male.    Chief Complaint: Post-op Evaluation (s/p)    Gurwinder Sainz is a 28 y.o. male who was found to have a left varicocele.   After discussion with the patient, he has elected to undergo surgical correction of his varicocele with Dr. Rudd.  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 He would like this excised.    10/15/2019:  Procedure(s) Performed:   -left microscopic varicocelectomy  -Intraoperative ultrasound  -Lysis of post circumcision penile skin bridge    Findings:  Left grade 2 varicocele, two left testicular arteries.   Small dorsal penile skin bridge excised.     Here for a wound/incision check.  Reporting that over the past few days he has noticed a enlarging bump to his incision site.  Some testicle discomfort on left side.  No urinary complaints.  Not taking any pain meds.            No past medical history on file.    Past Surgical History:  2018: COLONOSCOPY; N/A      Comment:  Procedure: COLONOSCOPY;  Surgeon: DIEGO Walker MD;                 [facility]: [facility] ([facility]);  Service: Endoscopy;                 Laterality: N/A;  10/15/2019: PENILE ADHESIONS LYSIS; N/A      Comment:  Procedure: LYSIS, ADHESIONS, PENIS;  Surgeon: Bjorn Rudd MD;  Location: [address];  Service:                Urology;  Laterality: N/A;  No date: testicular torsion procedure  No date: TONSILLECTOMY  No date: upper wisdom teeth  10/15/2019: VARICOCELECTOMY; Left      Comment:  Procedure: EXCISION, VARICOCELE;  Surgeon: Bjorn Rudd MD;  Location: [address];  Service:                Urology;  Laterality: Left;  1.5hroperating microscope    Review of patient's family history indicates:  Problem: Diabetes      Relation: Mother          Age of Onset: (Not Specified)  Problem: Diabetes      Relation: Father          Age of Onset: (Not  Specified)      Social History    Socioeconomic History      Marital status: Single      Spouse name: Not on file      Number of children: Not on file      Years of education: Not on file      Highest education level: Not on file    Occupational History      Not on file    Social Needs      Financial resource strain: Not on file      Food insecurity:        Worry: Not on file        Inability: Not on file      Transportation needs:        Medical: Not on file        Non-medical: Not on file    Tobacco Use      Smoking status: Former Smoker        Quit date: 2015        Years since quittin.2      Smokeless tobacco: Never Used    Substance and Sexual Activity      Alcohol use: Yes        Comment: once a month      Drug use: No      Sexual activity: Not on file    Lifestyle      Physical activity:        Days per week: Not on file        Minutes per session: Not on file      Stress: Not on file    Relationships      Social connections:        Talks on phone: Not on file        Gets together: Not on file        Attends [employer] service: Not on file        Active member of club or organization: Not on file        Attends meetings of clubs or organizations: Not on file        Relationship status: Not on file    Other Topics      Concerns:        Not on file    Social History Narrative      Not on file      Allergies:  Flagyl (metronidazole hcl); Pecan nut; Pineapple; and [place]    Medications:  No current outpatient medications on file.        Review of Systems   Constitutional: Negative for activity change, appetite change, chills and fever.   HENT: Negative for facial swelling and trouble swallowing.    Eyes: Negative for visual disturbance.   Respiratory: Negative for chest tightness and shortness of breath.    Cardiovascular: Negative for chest pain and palpitations.   Gastrointestinal: Negative.  Negative for abdominal pain, constipation, diarrhea, nausea and vomiting.   Genitourinary: Positive for  testicular pain. Negative for difficulty urinating, dysuria, flank pain, hematuria, penile pain, penile swelling and scrotal swelling.   Musculoskeletal: Negative for back pain, gait problem, myalgias and neck stiffness.   Skin: Positive for wound. Negative for rash.        Swelling to lower abdominal incision.     Neurological: Negative for dizziness and speech difficulty.   Hematological: Does not bruise/bleed easily.   Psychiatric/Behavioral: Negative for behavioral problems.       Objective:      Physical Exam   Nursing note and vitals reviewed.  Constitutional: He is oriented to person, place, and time. Vital signs are normal. He appears well-developed and well-nourished. He is active and cooperative.  Non-toxic appearance. He does not have a sickly appearance.   HENT:   Head: Normocephalic and atraumatic.   Right Ear: External ear normal.   Left Ear: External ear normal.   Nose: Nose normal.   Mouth/Throat: Mucous membranes are normal.   Eyes: Conjunctivae and lids are normal. No scleral icterus.   Neck: Trachea normal, normal range of motion and full passive range of motion without pain. Neck supple. No JVD present. No tracheal deviation present.   Cardiovascular: Normal rate, S1 normal and S2 normal.    Pulmonary/Chest: Effort normal. No respiratory distress. He exhibits no tenderness.   Abdominal: Soft. Normal appearance. There is no hepatosplenomegaly. There is no tenderness. There is no CVA tenderness.       Genitourinary: Testes normal and penis normal. Right testis shows no mass, no swelling and no tenderness. Left testis shows no mass, no swelling and no tenderness. Circumcised.   Genitourinary Comments: Normal scrotum. No erythema noted;      Musculoskeletal: Normal range of motion.   Lymphadenopathy: No inguinal adenopathy noted on the right or left side.   Neurological: He is alert and oriented to person, place, and time. He has normal strength.   Skin: Skin is warm, dry and intact.     Psychiatric:  He has a normal mood and affect. His behavior is normal. Judgment and thought content normal.       Assessment:       1. Left lower quadrant abdominal swelling, mass and lump    2. Post-operative state    3. Pain in left testicle    4. S/P scrotal varicocelectomy        Plan:         I spent 20 minutes with the patient of which more than half was spent in direct consultation with the patient in regards to our treatment and plan.    Education and recommendations of today's plan of care including home remedies.  We discussed office findings. No indication of infection or issue with incision healing  daypro BID for discomfort; activity restriction  US of area of concern; patient voiced appreciation.

## 2019-11-18 ENCOUNTER — OFFICE VISIT (OUTPATIENT)
Dept: UROLOGY | Facility: CLINIC | Age: 29
End: 2019-11-18
Payer: COMMERCIAL

## 2019-11-18 VITALS
HEART RATE: 67 BPM | WEIGHT: 257.94 LBS | SYSTOLIC BLOOD PRESSURE: 110 MMHG | BODY MASS INDEX: 38.2 KG/M2 | DIASTOLIC BLOOD PRESSURE: 69 MMHG | HEIGHT: 69 IN

## 2019-11-18 DIAGNOSIS — I86.1 VARICOCELE: Primary | ICD-10-CM

## 2019-11-18 PROBLEM — N48.89 PENILE SKIN BRIDGE: Status: RESOLVED | Noted: 2019-10-15 | Resolved: 2019-11-18

## 2019-11-18 PROCEDURE — 99999 PR PBB SHADOW E&M-EST. PATIENT-LVL III: ICD-10-PCS | Mod: PBBFAC,,, | Performed by: UROLOGY

## 2019-11-18 PROCEDURE — 99024 POSTOP FOLLOW-UP VISIT: CPT | Mod: S$GLB,,, | Performed by: UROLOGY

## 2019-11-18 PROCEDURE — 99024 PR POST-OP FOLLOW-UP VISIT: ICD-10-PCS | Mod: S$GLB,,, | Performed by: UROLOGY

## 2019-11-18 PROCEDURE — 99999 PR PBB SHADOW E&M-EST. PATIENT-LVL III: CPT | Mod: PBBFAC,,, | Performed by: UROLOGY

## 2019-11-18 NOTE — PROGRESS NOTES
"Chief Complaint:  Infertility    HPI:    Mr. Sainz is a 29 y.o.  male who has been  to his wife for the past 1 years. They have been trying to achieve a pregnancy for the past 1 years but without success. 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 He denies a history of erectile dysfunction and ejaculatory problems.  He's s/p L varicocelectomy on 10/15/19.    He has a history of bilateral orchiopexy for torsion at age 13.    Lab Results   Component Value Date    TOTALTESTOST 320 2019    LABLH 2.3 2019    FSH 1.40 2019    ESTRADIOL 29 2019    PROLACTIN 4.4 2019     SA 19--2.0 cc/10.5 million per cc/57%/3.5%  SA 9/3/19--2.5 cc/16 million per cc/72%/1.5%    FOR REVIEW FROM PREVIOUS:    Mr. Sainz is a 28 y.o.  male who has been  to his wife for the past 1 years. They have been trying to achieve a pregnancy for the past 1 years but without success. Gurwinder Sainz has undergone a semen analysis x 1, but I don't have the results. He denies a history of erectile dysfunction and ejaculatory problems.    He has a history of bilateral orchiopexy for torsion at age 13.    He has achieved 0 pregnancies in the past.    Patricia Soler is 27 years old. ( [birth year]) Her menses are regular. She has not undergone prior hysterosalpingogram. She has achieved 0 prior pregnancies.  She sees Dr. Labadie.    The couple has not undergone prior intrauterine insemination procedures.    The couple has not undergone prior in-vitro fertilization procedures.    Gurwinder Sainz denies a history of exposure to harmful chemicals, toxins, and radiation.    No history of recent fevers greater than 101.5 degrees Farenheit.    No history of recent exposure to "wet heat."    No history of urological trauma.  + torsion.    No history of prostatitis, epididymitis, and orchitis.    No history of post-pubertal mumps.    There is no known family history of fertility " problems.    REVIEW OF SYSTEMS:     He denies headache, blurred vision, fever, nausea, vomiting, chills, flank discomfort, abdominal pain, bleeding per rectum, cough, SOB, recent loss of consciousness, recent mental status changes, seizures, dizziness, or upper/lower extremity weakness.    PHYSICAL EXAM:     The patient generally appears in good health, is appropriately interactive, and is in no apparent distress.     Eyes: anicteric sclerae, moist conjunctivae; no lid-lag; PERRLA     HENT: Atraumatic; oropharynx clear with moist mucous membranes and no mucosal ulcerations;normal hard and soft palate.  No evidence of lymphadenopathy.    Neck: Trachea midline.  No thyromegaly.    Musculoskeletal: No abnormal gait.    Skin: No lesions.    Mental: Cooperative with normal affect.  Is oriented to time, place, and person.    Neuro: Grossly intact.    Chest: Normal inspiratory effort.   No accessory muscles.  No audible wheezes.  Respirations symmetric on inspiration and expiration.    Heart: Regular rhythm.      Abdomen:  Soft, non-tender. No masses or organomegaly. Bladder is not palpable. No evidence of flank discomfort. No evidence of inguinal hernia.    Genitourinary: Penis is normal with no evidence of plaques or induration. Urethral meatus is normal. Scrotum is normal. Testes are descended bilaterally with no evidence of abnormal masses or tenderness. Epididymis, vas deferens, and cord structures are normal bilaterally.  Testicular volume is approximately 18 cc bilaterally.     Extremities: No cyanosis, clubbing, or edema.    IMPRESSION & PLAN:    Mr. Sainz is a 29 y.o.  male who has been  to his wife for the past 1 years. They have been trying to achieve a pregnancy for the past 1 years but without success. 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 He denies a history of erectile dysfunction and ejaculatory problems.  He's s/p L varicocelectomy on 10/15/19.    He has a  "history of bilateral orchiopexy for torsion at age 13.    Lab Results   Component Value Date    TOTALTESTOST 320 08/29/2019    LABLH 2.3 08/29/2019    FSH 1.40 08/29/2019    ESTRADIOL 29 08/29/2019    PROLACTIN 4.4 08/29/2019     SA 8/9/19--2.0 cc/10.5 million per cc/57%/3.5%  SA 9/3/19--2.5 cc/16 million per cc/72%/1.5%    1.  RTC 3 months with another SA.  2.  Recommend avoiding "wet heat."  3.  Recommend taking a multivitamin and 500 mg of vitamin c daily in addition to the multivitamin.  4.  Please send a copy of the note to Dr. Labadie.          CC:     "

## 2020-01-27 ENCOUNTER — TELEPHONE (OUTPATIENT)
Dept: UROLOGY | Facility: CLINIC | Age: 30
End: 2020-01-27

## 2020-01-27 NOTE — TELEPHONE ENCOUNTER
Returned pts call. Message left on pts voicemail informing pt appt needed with nurse practitioner for evaluation.

## 2020-01-27 NOTE — TELEPHONE ENCOUNTER
----- Message from Carmelo Song sent at 1/27/2020 10:58 AM CST -----  Contact: pt: [phone]  Pt state it looks like his incision from 10/15/19 surgery is opening slightly  and would like to know if he should come in and be seen       Please contact pt: [phone]

## 2020-01-28 ENCOUNTER — OFFICE VISIT (OUTPATIENT)
Dept: UROLOGY | Facility: CLINIC | Age: 30
End: 2020-01-28
Payer: COMMERCIAL

## 2020-01-28 VITALS
BODY MASS INDEX: 38.59 KG/M2 | HEIGHT: 69 IN | DIASTOLIC BLOOD PRESSURE: 79 MMHG | WEIGHT: 260.56 LBS | SYSTOLIC BLOOD PRESSURE: 115 MMHG | HEART RATE: 61 BPM

## 2020-01-28 DIAGNOSIS — Z86.79 HISTORY OF VARICOCELE: ICD-10-CM

## 2020-01-28 DIAGNOSIS — T14.8XXA ABRASION: Primary | ICD-10-CM

## 2020-01-28 PROCEDURE — 99999 PR PBB SHADOW E&M-EST. PATIENT-LVL III: ICD-10-PCS | Mod: PBBFAC,,, | Performed by: PHYSICIAN ASSISTANT

## 2020-01-28 PROCEDURE — 3008F PR BODY MASS INDEX (BMI) DOCUMENTED: ICD-10-PCS | Mod: CPTII,S$GLB,, | Performed by: PHYSICIAN ASSISTANT

## 2020-01-28 PROCEDURE — 99213 OFFICE O/P EST LOW 20 MIN: CPT | Mod: S$GLB,,, | Performed by: PHYSICIAN ASSISTANT

## 2020-01-28 PROCEDURE — 3008F BODY MASS INDEX DOCD: CPT | Mod: CPTII,S$GLB,, | Performed by: PHYSICIAN ASSISTANT

## 2020-01-28 PROCEDURE — 99999 PR PBB SHADOW E&M-EST. PATIENT-LVL III: CPT | Mod: PBBFAC,,, | Performed by: PHYSICIAN ASSISTANT

## 2020-01-28 PROCEDURE — 99213 PR OFFICE/OUTPT VISIT, EST, LEVL III, 20-29 MIN: ICD-10-PCS | Mod: S$GLB,,, | Performed by: PHYSICIAN ASSISTANT

## 2020-01-28 NOTE — PROGRESS NOTES
CHIEF COMPLAINT:    Mr. Sainz is a 29 y.o. male presenting for incision check.    PRESENTING ILLNESS:    Gurwinder Sainz is a 29 y.o. male with a PMH of varicocelectomy  who presents for incision site has opened.  Per patient, it has opened before in the past and was infected.  It has been open for at least a week.  He does not feel it is infected now but it does sting.  He reports pus and blood draining from it.    He reports a little bit of redness around the area.  No swelling.  No trauma that he can recall.  He has been keeping it clean with soap and water.  He has been applying neosporin to the site.    He does not have any urinary complaints today.      Date: 10/15/2019  Procedure(s) Performed:   left microscopic varicocelectomy  Intraoperative ultrasound  Lysis of post circumcision penile skin bridge   Specimen(s): none   Staff Surgeon: Bjorn Rudd MD  Findings:  Left grade 2 varicocele, two left testicular arteries.   Small dorsal penile skin bridge excised.        REVIEW OF SYSTEMS:    Constitutional: Negative for fever and chills.   HENT: Negative for hearing loss.   Eyes: Negative for visual disturbance.   Respiratory: Negative for shortness of breath.   Cardiovascular: Negative for chest pain.   Gastrointestinal: Negative for vomiting, and constipation.   Genitourinary: See HPI  Neurological: Negative for dizziness.   Hematological: Does not bruise/bleed easily.   Psychiatric/Behavioral: Negative for confusion.       PATIENT HISTORY:    No past medical history on file.    Past Surgical History:   Procedure Laterality Date    COLONOSCOPY N/A 4/16/2018    Procedure: COLONOSCOPY;  Surgeon: DIEGO Walker MD;  Location: [facility] (4TH FLR);  Service: Endoscopy;  Laterality: N/A;    PENILE ADHESIONS LYSIS N/A 10/15/2019    Procedure: LYSIS, ADHESIONS, PENIS;  Surgeon: Bjorn Rudd MD;  [facility];  Service: Urology;  Laterality: N/A;    testicular torsion procedure       TONSILLECTOMY      upper wisdom teeth      VARICOCELECTOMY Left 10/15/2019    Procedure: EXCISION, VARICOCELE;  Surgeon: Bjorn Rudd MD;  Location: [facility] OR [address] [place] [address];  Service: Urology;  Laterality: Left;  1.5hr  operating microscope       Family History   Problem Relation Age of Onset    Diabetes Mother     Diabetes Father        Social History     Socioeconomic History    Marital status: Single     Spouse name: Not on file    Number of children: Not on file    Years of education: Not on file    Highest education level: Not on file   Occupational History    Not on file   Social Needs    Financial resource strain: Not on file    Food insecurity:     Worry: Not on file     Inability: Not on file    Transportation needs:     Medical: Not on file     Non-medical: Not on file   Tobacco Use    Smoking status: Former Smoker     Last attempt to quit: 2015     Years since quittin.4    Smokeless tobacco: Never Used   Substance and Sexual Activity    Alcohol use: Yes     Comment: once a month    Drug use: No    Sexual activity: Not on file   Lifestyle    Physical activity:     Days per week: Not on file     Minutes per session: Not on file    Stress: Not on file   Relationships    Social connections:     Talks on phone: Not on file     Gets together: Not on file     Attends [employer] service: Not on file     Active member of club or organization: Not on file     Attends meetings of clubs or organizations: Not on file     Relationship status: Not on file   Other Topics Concern    Not on file   Social History Narrative    Not on file       Allergies:  Flagyl [metronidazole hcl]; Pecan nut; Pineapple; and [place]    Medications:  No current outpatient medications on file.    PHYSICAL EXAMINATION:    Constitutional: He appears well-developed and well-nourished.  He is in no apparent distress.    Eyes: No scleral icterus noted bilaterally. No discharge bilaterally.    Nose: No  rhinorrhea    Cardiovascular: Normal rate.  No pitting edema noted in lower extremities bilaterally    Pulmonary/Chest: Effort normal. No respiratory distress.     Abdominal:  He exhibits no distension.  There is no CVA tenderness.     Lymphadenopathy:        Right: No supraclavicular adenopathy present.        Left: No supraclavicular adenopathy present.     Neurological: He is alert and oriented to person, place, and time.     Skin: Skin is warm and dry.     Psych: Cooperative with normal affect.    Physical Exam   Abdominal:           IMPRESSION:    Encounter Diagnoses   Name Primary?    Abrasion Yes    History of varicocele          PLAN:    Reassured patient.  No signs of infection.    There is a very small opening.  It is very superficial and likely due to the band of his underwear and pants rubbing the scar. His scar is along his belt line.  Recommend he pull his pants and underwear band over the scar so it does not rub.    Continue to keep area clean and dry.      Follow up as needed.        Leni Robertson PA-C

## 2020-03-06 ENCOUNTER — PATIENT MESSAGE (OUTPATIENT)
Dept: UROLOGY | Facility: CLINIC | Age: 30
End: 2020-03-06

## 2021-03-31 ENCOUNTER — HOSPITAL ENCOUNTER (EMERGENCY)
Facility: HOSPITAL | Age: 31
Discharge: HOME | End: 2021-03-31
Attending: EMERGENCY MEDICINE
Payer: COMMERCIAL

## 2021-03-31 VITALS
OXYGEN SATURATION: 95 % | RESPIRATION RATE: 18 BRPM | WEIGHT: 308 LBS | HEIGHT: 69 IN | DIASTOLIC BLOOD PRESSURE: 92 MMHG | BODY MASS INDEX: 45.62 KG/M2 | SYSTOLIC BLOOD PRESSURE: 138 MMHG | TEMPERATURE: 99 F | HEART RATE: 77 BPM

## 2021-03-31 DIAGNOSIS — T78.40XA ALLERGIC REACTION, INITIAL ENCOUNTER: Primary | ICD-10-CM

## 2021-03-31 PROCEDURE — 96375 TX/PRO/DX INJ NEW DRUG ADDON: CPT

## 2021-03-31 PROCEDURE — 86803 HEPATITIS C AB TEST: CPT | Performed by: EMERGENCY MEDICINE

## 2021-03-31 PROCEDURE — 99291 PR CRITICAL CARE, E/M 30-74 MINUTES: ICD-10-PCS | Mod: ,,, | Performed by: EMERGENCY MEDICINE

## 2021-03-31 PROCEDURE — 86703 HIV-1/HIV-2 1 RESULT ANTBDY: CPT | Performed by: EMERGENCY MEDICINE

## 2021-03-31 PROCEDURE — 63600175 PHARM REV CODE 636 W HCPCS: Performed by: EMERGENCY MEDICINE

## 2021-03-31 PROCEDURE — 99291 CRITICAL CARE FIRST HOUR: CPT | Mod: ,,, | Performed by: EMERGENCY MEDICINE

## 2021-03-31 PROCEDURE — 25000003 PHARM REV CODE 250: Performed by: EMERGENCY MEDICINE

## 2021-03-31 PROCEDURE — 96374 THER/PROPH/DIAG INJ IV PUSH: CPT

## 2021-03-31 PROCEDURE — 96372 THER/PROPH/DIAG INJ SC/IM: CPT | Mod: 59

## 2021-03-31 PROCEDURE — 99291 CRITICAL CARE FIRST HOUR: CPT | Mod: 25

## 2021-03-31 RX ORDER — EPINEPHRINE 0.3 MG/.3ML
0.3 INJECTION SUBCUTANEOUS
Status: COMPLETED | OUTPATIENT
Start: 2021-03-31 | End: 2021-03-31

## 2021-03-31 RX ORDER — DEXAMETHASONE SODIUM PHOSPHATE 4 MG/ML
10 INJECTION, SOLUTION INTRA-ARTICULAR; INTRALESIONAL; INTRAMUSCULAR; INTRAVENOUS; SOFT TISSUE
Status: COMPLETED | OUTPATIENT
Start: 2021-03-31 | End: 2021-03-31

## 2021-03-31 RX ORDER — DIPHENHYDRAMINE HYDROCHLORIDE 50 MG/ML
50 INJECTION INTRAMUSCULAR; INTRAVENOUS
Status: COMPLETED | OUTPATIENT
Start: 2021-03-31 | End: 2021-03-31

## 2021-03-31 RX ADMIN — EPINEPHRINE 0.3 MG: 0.3 INJECTION INTRAMUSCULAR at 07:03

## 2021-03-31 RX ADMIN — DIPHENHYDRAMINE HYDROCHLORIDE 50 MG: 50 INJECTION, SOLUTION INTRAMUSCULAR; INTRAVENOUS at 06:03

## 2021-03-31 RX ADMIN — DEXAMETHASONE SODIUM PHOSPHATE 10 MG: 4 INJECTION INTRA-ARTICULAR; INTRALESIONAL; INTRAMUSCULAR; INTRAVENOUS; SOFT TISSUE at 06:03

## 2021-04-01 ENCOUNTER — TELEPHONE (OUTPATIENT)
Dept: EMERGENCY MEDICINE | Facility: HOSPITAL | Age: 31
End: 2021-04-01

## 2021-04-01 LAB
HCV AB SERPL QL IA: NEGATIVE
HIV 1+2 AB+HIV1 P24 AG SERPL QL IA: NEGATIVE

## 2022-10-10 ENCOUNTER — TELEPHONE (OUTPATIENT)
Dept: NEUROSURGERY | Facility: CLINIC | Age: 32
End: 2022-10-10
Payer: COMMERCIAL

## 2022-10-10 NOTE — TELEPHONE ENCOUNTER
Called pt re: appt made for him. 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 Advised to see a nsgn. Now has health insurance to do it.    Made appt with Essie. Dr Galdamez will want [facility] w & w/o. Pt agreed with plan, date and time.

## 2022-10-18 ENCOUNTER — OFFICE VISIT (OUTPATIENT)
Dept: NEUROSURGERY | Facility: CLINIC | Age: 32
End: 2022-10-18
Payer: COMMERCIAL

## 2022-10-18 VITALS
BODY MASS INDEX: 42.35 KG/M2 | SYSTOLIC BLOOD PRESSURE: 111 MMHG | WEIGHT: 286.81 LBS | HEART RATE: 88 BPM | DIASTOLIC BLOOD PRESSURE: 77 MMHG

## 2022-10-18 DIAGNOSIS — M89.9 SKULL LESION: Primary | ICD-10-CM

## 2022-10-18 PROCEDURE — 1159F PR MEDICATION LIST DOCUMENTED IN MEDICAL RECORD: ICD-10-PCS | Mod: CPTII,S$GLB,, | Performed by: PHYSICIAN ASSISTANT

## 2022-10-18 PROCEDURE — 3078F DIAST BP <80 MM HG: CPT | Mod: CPTII,S$GLB,, | Performed by: PHYSICIAN ASSISTANT

## 2022-10-18 PROCEDURE — 99205 OFFICE O/P NEW HI 60 MIN: CPT | Mod: S$GLB,,, | Performed by: PHYSICIAN ASSISTANT

## 2022-10-18 PROCEDURE — 99999 PR PBB SHADOW E&M-EST. PATIENT-LVL III: ICD-10-PCS | Mod: PBBFAC,,, | Performed by: PHYSICIAN ASSISTANT

## 2022-10-18 PROCEDURE — 3074F SYST BP LT 130 MM HG: CPT | Mod: CPTII,S$GLB,, | Performed by: PHYSICIAN ASSISTANT

## 2022-10-18 PROCEDURE — 99999 PR PBB SHADOW E&M-EST. PATIENT-LVL III: CPT | Mod: PBBFAC,,, | Performed by: PHYSICIAN ASSISTANT

## 2022-10-18 PROCEDURE — 3078F PR MOST RECENT DIASTOLIC BLOOD PRESSURE < 80 MM HG: ICD-10-PCS | Mod: CPTII,S$GLB,, | Performed by: PHYSICIAN ASSISTANT

## 2022-10-18 PROCEDURE — 1159F MED LIST DOCD IN RCRD: CPT | Mod: CPTII,S$GLB,, | Performed by: PHYSICIAN ASSISTANT

## 2022-10-18 PROCEDURE — 99205 PR OFFICE/OUTPT VISIT, NEW, LEVL V, 60-74 MIN: ICD-10-PCS | Mod: S$GLB,,, | Performed by: PHYSICIAN ASSISTANT

## 2022-10-18 PROCEDURE — 3074F PR MOST RECENT SYSTOLIC BLOOD PRESSURE < 130 MM HG: ICD-10-PCS | Mod: CPTII,S$GLB,, | Performed by: PHYSICIAN ASSISTANT

## 2022-10-18 RX ORDER — LAMOTRIGINE 100 MG/1
100 TABLET ORAL DAILY
COMMUNITY
Start: 2022-06-22

## 2022-10-18 RX ORDER — ESCITALOPRAM OXALATE 10 MG/1
10 TABLET ORAL DAILY
COMMUNITY
Start: 2022-07-14

## 2022-10-18 NOTE — PROGRESS NOTES
"Neurosurgery  History & Physical    SUBJECTIVE:     Chief Complaint: skull lesion    History of Present Illness:  Gurwinder Sainz is a 31 y.o. male with h/o of bipolar d/o who presents to clinic today to establish care. He was referred to NSGY by his PCP for right frontal calvarium lesion. Patient reports he noticed the prominence in 2008. He reports it has stayed the same size since 2008. He had a CT head in 2017 which showed "Small sessile appearing osteoma arising from the right frontal calvarium accounting for patient's complaint of a right scalp mass." The CT scan also demonstrated a 1.9 cm dural based calcification along the falx as well as hyperostosis of the right parietal-occipital calvarium. Patient reports he was referred to NSGY at that time but did not make an appointment due to insurance issues. He reports the prominence has not changed in size and is not painful, but has become more visible with receding hair line. Denies history of cancer. Denies headaches.     Review of patient's allergies indicates:   Allergen Reactions    Covid-19 vaccine,omicron [id number],  mrna (pfizer)     Flagyl [metronidazole hcl]     Pecan nut     Pineapple     [place]        Current Outpatient Medications   Medication Sig Dispense Refill    EScitalopram oxalate (LEXAPRO) 10 MG tablet Take 10 mg by mouth once daily.      lamoTRIgine (LAMICTAL) 100 MG tablet Take 100 mg by mouth once daily.       No current facility-administered medications for this visit.       No past medical history on file.  Past Surgical History:   Procedure Laterality Date    COLONOSCOPY N/A 4/16/2018    Procedure: COLONOSCOPY;  Surgeon: DIEGO Walker MD;  Location: [facility] (4TH FLR);  Service: Endoscopy;  Laterality: N/A;    PENILE ADHESIONS LYSIS N/A 10/15/2019    Procedure: LYSIS, ADHESIONS, PENIS;  Surgeon: Bjorn Rudd MD;  [facility];  Service: Urology;  Laterality: N/A;    testicular torsion procedure      TONSILLECTOMY      " upper wisdom teeth      VARICOCELECTOMY Left 10/15/2019    Procedure: EXCISION, VARICOCELE;  Surgeon: Bjorn Rudd MD;  Location: [facility] OR [address] [place] [address];  Service: Urology;  Laterality: Left;  1.5hr  operating microscope     Family History       Problem Relation (Age of Onset)    Diabetes Mother, Father          Social History     Socioeconomic History    Marital status:    Tobacco Use    Smoking status: Former     Types: Cigarettes     Quit date: 2015     Years since quittin.2    Smokeless tobacco: Never   Substance and Sexual Activity    Alcohol use: Yes     Comment: once a month    Drug use: No       Review of Systems   Constitutional:  Negative for chills, fatigue and fever.   Eyes:  Negative for photophobia and visual disturbance.   Respiratory:  Negative for cough and shortness of breath.    Cardiovascular:  Negative for chest pain, palpitations and leg swelling.   Gastrointestinal:  Negative for constipation, diarrhea, nausea and vomiting.   Genitourinary:  Negative for difficulty urinating, dysuria, frequency and urgency.   Musculoskeletal:  Negative for back pain, gait problem and neck pain.   Neurological:  Negative for dizziness, seizures, speech difficulty, weakness, numbness and headaches.   Psychiatric/Behavioral:  Negative for confusion. The patient is not nervous/anxious.      OBJECTIVE:     Vital Signs  Pulse: 88  BP: 111/77  Pain Score: 0-No pain  Weight: 130.1 kg (286 lb 12.7 oz)  Body mass index is 42.35 kg/m².      Neurosurgery Physical Exam  General: well developed, well nourished, no distress.   Head: normocephalic with 1.5 cm right frontal bony prominence, non tender to palpation, no fluctuance/erythema. Overlying skin intact. atraumatic  Neurologic: Alert and oriented. Thought content appropriate.  Language: No aphasia  Speech: No dysarthria  Cranial nerves: face symmetric, CN II-XII grossly intact.   Eyes: pupils equal, round, reactive to light with accommodation, EOMI.    Pulmonary: normal respirations, no signs of respiratory distress  Skin: Skin is warm, dry and intact.  Sensory: intact to light touch throughout  Motor Strength:Moves all extremities spontaneously with good tone.  Full strength upper and lower extremities. No abnormal movements seen.   Gait stable, fluid.       Diagnostic Results:  I have personally reviewed imaging and agree with the findings    CT head wo contrast (1/31/2017): There is a small 1.1 cm sessile appearing osteoma arising from the right frontal calvarium accounting for the patient's complaint of a right scalp mass. There is hyperostosis of the right parieto-occipital calvarium as well as dense/bulky dural based calcification along the right tentorium cerebri. This contributes to a mild mass effect on the adjacent brain parenchyma without evidence of midline shift or edema. Additional dural based oval-shaped 1.9 cm calcification noted along the falx with mild adjacent mass effect. There is no evidence of acute intracranial hemorrhage or abnormal parenchymal attenuation.  Ventricles are normal in size and configuration without any evidence of hydrocephalus. 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 There is no midline shift. There is a mucous retention cyst or polyp in the right maxillary antrum. The remaining visualized paranasal sinuses and mastoid air cells are clear.    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     -Need repeat CT head w/wo contrast to full assess and evaluate for growth of other noted abnormalities noted above in the 2017 scan. Will have patient see Dr. Galdamez after scan to discuss if surgical intervention is warranted.   -All questions answered. Patient encouraged to call with any additional questions or concerns.     Essie Rowe PA-C  Neurosurgery     Time spent on this encounter: 60 minutes. This includes face-to-face time and non-face to face  time preparing to see the patient (eg, review of tests), obtaining and/or reviewing separately obtained history, documenting clinical information in the electronic or other health record, independently interpreting results and communicating results to the patient/family/caregiver, or care coordinator       Note dictated with voice recognition software, please excuse any grammatical errors.

## 2022-10-21 ENCOUNTER — HOSPITAL ENCOUNTER (OUTPATIENT)
Dept: RADIOLOGY | Facility: OTHER | Age: 32
Discharge: HOME | End: 2022-10-21
Attending: PHYSICIAN ASSISTANT
Payer: COMMERCIAL

## 2022-10-21 DIAGNOSIS — M89.9 SKULL LESION: ICD-10-CM

## 2022-10-21 PROCEDURE — 70460 CT HEAD/BRAIN W/DYE: CPT | Mod: 26,,, | Performed by: RADIOLOGY

## 2022-10-21 PROCEDURE — 70460 CT HEAD WITH CONTRAST: ICD-10-PCS | Mod: 26,,, | Performed by: RADIOLOGY

## 2022-10-21 PROCEDURE — 25500020 PHARM REV CODE 255: Performed by: PHYSICIAN ASSISTANT

## 2022-10-21 PROCEDURE — 70460 CT HEAD/BRAIN W/DYE: CPT | Mod: TC

## 2022-10-21 RX ADMIN — IOHEXOL 75 ML: 350 INJECTION, SOLUTION INTRAVENOUS at 04:10

## 2022-10-25 ENCOUNTER — PATIENT MESSAGE (OUTPATIENT)
Dept: NEUROSURGERY | Facility: CLINIC | Age: 32
End: 2022-10-25
Payer: COMMERCIAL

## 2022-11-01 ENCOUNTER — OFFICE VISIT (OUTPATIENT)
Dept: NEUROSURGERY | Facility: CLINIC | Age: 32
End: 2022-11-01
Payer: COMMERCIAL

## 2022-11-01 VITALS
WEIGHT: 283.94 LBS | HEART RATE: 72 BPM | HEIGHT: 69 IN | SYSTOLIC BLOOD PRESSURE: 114 MMHG | OXYGEN SATURATION: 99 % | BODY MASS INDEX: 42.05 KG/M2 | DIASTOLIC BLOOD PRESSURE: 82 MMHG

## 2022-11-01 DIAGNOSIS — M89.9 SKULL LESION: Primary | ICD-10-CM

## 2022-11-01 PROCEDURE — 3079F PR MOST RECENT DIASTOLIC BLOOD PRESSURE 80-89 MM HG: ICD-10-PCS | Mod: CPTII,S$GLB,, | Performed by: NEUROLOGICAL SURGERY

## 2022-11-01 PROCEDURE — 99214 OFFICE O/P EST MOD 30 MIN: CPT | Mod: S$GLB,,, | Performed by: NEUROLOGICAL SURGERY

## 2022-11-01 PROCEDURE — 3074F SYST BP LT 130 MM HG: CPT | Mod: CPTII,S$GLB,, | Performed by: NEUROLOGICAL SURGERY

## 2022-11-01 PROCEDURE — 99999 PR PBB SHADOW E&M-EST. PATIENT-LVL IV: CPT | Mod: PBBFAC,,, | Performed by: NEUROLOGICAL SURGERY

## 2022-11-01 PROCEDURE — 3074F PR MOST RECENT SYSTOLIC BLOOD PRESSURE < 130 MM HG: ICD-10-PCS | Mod: CPTII,S$GLB,, | Performed by: NEUROLOGICAL SURGERY

## 2022-11-01 PROCEDURE — 1159F MED LIST DOCD IN RCRD: CPT | Mod: CPTII,S$GLB,, | Performed by: NEUROLOGICAL SURGERY

## 2022-11-01 PROCEDURE — 99999 PR PBB SHADOW E&M-EST. PATIENT-LVL IV: ICD-10-PCS | Mod: PBBFAC,,, | Performed by: NEUROLOGICAL SURGERY

## 2022-11-01 PROCEDURE — 1160F PR REVIEW ALL MEDS BY PRESCRIBER/CLIN PHARMACIST DOCUMENTED: ICD-10-PCS | Mod: CPTII,S$GLB,, | Performed by: NEUROLOGICAL SURGERY

## 2022-11-01 PROCEDURE — 1159F PR MEDICATION LIST DOCUMENTED IN MEDICAL RECORD: ICD-10-PCS | Mod: CPTII,S$GLB,, | Performed by: NEUROLOGICAL SURGERY

## 2022-11-01 PROCEDURE — 3079F DIAST BP 80-89 MM HG: CPT | Mod: CPTII,S$GLB,, | Performed by: NEUROLOGICAL SURGERY

## 2022-11-01 PROCEDURE — 99214 PR OFFICE/OUTPT VISIT, EST, LEVL IV, 30-39 MIN: ICD-10-PCS | Mod: S$GLB,,, | Performed by: NEUROLOGICAL SURGERY

## 2022-11-01 PROCEDURE — 1160F RVW MEDS BY RX/DR IN RCRD: CPT | Mod: CPTII,S$GLB,, | Performed by: NEUROLOGICAL SURGERY

## 2022-11-01 NOTE — PATIENT INSTRUCTIONS
I have personally reviewed the CTH with the pt which shows prominent ossification from the frontal calvarium represents the palpable abnormality.  This is similar dating back to 2017. Extensive ossification of the dura is identified with some mass effect in the posterior fossa as discussed above similar to the prior study of 2017.    I recommend right frontal craniotomy for osteoma. I have discussed the risks/benefits, indications, and alternatives for the proposed procedure in detail. I have answered all of their questions and patient wish to proceed with surgery. We will schedule patient.

## 2022-11-01 NOTE — PROGRESS NOTES
"Subjective:   I, Lynn Hood, attest that this documentation has been prepared under the direction and in the presence of Adal Galdamez MD.     Patient ID: Gurwinder Sainz is a 32 y.o. male     Chief Complaint: No chief complaint on file.      HPI  Mr. Gurwinder Sainz is a 32 y.o. gentleman referred to me by Essie Rowe, who presents today to establish care. He was referred to NSGY by his PCP for right frontal calvarium lesion. Patient initially noticed the prominence in 2008. He had a CT head in 2017 which showed "small sessile appearing osteoma arising from the right frontal calvarium accounting for patient's complaint of a right scalp mass." The CT scan also demonstrated a 1.9 cm dural based calcification along the falx as well as hyperostosis of the right parietal-occipital calvarium. Patient states it has remained unchanged in size. However, he states it has become more prominent and visible with his receding hairline. Otherwise, pt presents asymptomatic and states this is non bothersome.    Review of Systems   Constitutional:  Negative for activity change, appetite change, fatigue, fever and unexpected weight change.   HENT:  Negative for facial swelling.    Eyes: Negative.    Respiratory: Negative.     Cardiovascular: Negative.    Gastrointestinal:  Negative for diarrhea, nausea and vomiting.   Endocrine: Negative.    Genitourinary: Negative.    Musculoskeletal:  Negative for back pain, joint swelling, myalgias and neck pain.   Neurological:  Negative for dizziness, seizures, weakness, numbness and headaches.   Psychiatric/Behavioral: Negative.        History reviewed. No pertinent past medical history.    Objective:      Vitals:    11/01/22 1020   BP: 114/82   Pulse: 72      Physical Exam  Constitutional:       General: He is not in acute distress.     Appearance: Normal appearance.   HENT:      Head: Normocephalic and atraumatic.   Pulmonary:      Effort: Pulmonary effort is normal.   Musculoskeletal: "      Cervical back: Neck supple.   Neurological:      Mental Status: He is alert and oriented to person, place, and time.      GCS: GCS eye subscore is 4. GCS verbal subscore is 5. GCS motor subscore is 6.      Cranial Nerves: No cranial nerve deficit.     IMAGING:  CT Head W Contrast (10/21/2022):  Prominent ossification from the frontal calvarium represents the palpable abnormality.  This is similar dating back to 2017. Extensive ossification of the dura is identified with some mass effect in the posterior fossa as discussed above similar to the prior study of 2017.    I have personally reviewed the images with the pt.      I, Dr. Adal Galdamez, personally performed the services described in this documentation. All medical record entries made by the scribe, Lynn Hood, were at my direction and in my presence.  I have reviewed the chart and agree that the record reflects my personal performance and is accurate and complete. Adal Galdamez MD. 11/01/2022    Assessment:       Osteoma.       Plan:   I have personally reviewed the CTH with the pt which shows prominent ossification from the frontal calvarium represents the palpable abnormality.  This is similar dating back to 2017. Extensive ossification of the dura is identified with some mass effect in the posterior fossa as discussed above similar to the prior study of 2017.    I recommend right frontal craniotomy for osteoma. I have discussed the risks/benefits, indications, and alternatives for the proposed procedure in detail. I have answered all of their questions and patient wish to proceed with surgery. We will schedule patient.

## 2022-12-05 ENCOUNTER — TELEPHONE (OUTPATIENT)
Dept: PREADMISSION TESTING | Facility: HOSPITAL | Age: 32
End: 2022-12-05
Payer: COMMERCIAL

## 2022-12-05 DIAGNOSIS — Z01.818 PREOPERATIVE TESTING: Primary | ICD-10-CM

## 2022-12-05 RX ORDER — TIRZEPATIDE 2.5 MG/.5ML
2.5 INJECTION, SOLUTION SUBCUTANEOUS
COMMUNITY

## 2022-12-05 NOTE — ANESTHESIA PAT ROS NOTE
12/05/2022  Gurwinder Sainz is a 32 y.o., male.      Pre-op Assessment          Review of Systems         Anesthesia Assessment: Preoperative EQUATION    Planned Procedure: Procedure(s) (LRB):  CRANIOTOMY for right frontal osteoma (Right)  Requested Anesthesia Type:General  Surgeon: Adal Galdamez MD  Service: Neurosurgery  Known or anticipated Date of Surgery:12/22/2022    Surgeon notes: reviewed    Electronic QUestionnaire Assessment completed via nurse interview with patient.        Triage considerations:     The patient has no apparent active cardiac condition (No unstable coronary Syndrome such as severe unstable angina or recent [<1 month] myocardial infarction, decompensated CHF, severe valvular   disease or significant arrhythmia)    Previous anesthesia records:GETA and No problems  10/15/2019   EXCISION, VARICOCELE (Left: Perineum)   LYSIS, ADHESIONS, PENIS (Abdomen)   Airway/Jaw/Neck:  Airway Findings: Mouth Opening: Normal Tongue: Normal  General Airway Assessment: Adult  Mallampati: III  Improves to II with phonation.  TM Distance: Normal, at least 6 cm        Airway Present Prior to Hospital Arrival?: No Placement Date: 10/15/19 Placement Time: 0949 Method of Intubation: Direct laryngoscopy Inserted by: CRNA Airway Device: Endotracheal Tube Mask Ventilation: Easy - oral Intubated: Postinduction Blade: Jose #2 Airway Device Size: 7.5 Style: Cuffed Cuff Inflation: Minimal occlusive pressure Inflation Amount (mL): 6 Placement Verified By: Auscultation;Capnometry;ETT Condensation Grade: Grade I Complicating Factors: None Findings Post-Intubation: Positive EtCO2;Bilateral breath sounds;Atraumatic/Condition of teeth unchanged Depth of Insertion (cm): 22 Secured at: Teeth Complications: None Breath Sounds: Equal Bilateral Insertion attempts (enter comment if more than 2 attempts): 2 , attempted RSI,  patient moving   Removal Date: 10/15/19 Removal Time: 1111     Last PCP note: 6-12 months ago , outside [facility]   Subspecialty notes: Neurosurgery    Other important co-morbidities: Morbid Obesity and OSTEOMA       Tests already available:  Available tests,  within 3 months , within [facility] .   10/31/2022 CT HEAD W CONTRAST          Instructions given. (See in Nurse's note)    Optimization:  Anesthesia Preop Clinic Assessment  Indicated    Medical Opinion Indicated           Plan:    Testing:  PT/INR and T&S   Pre-anesthesia  visit       Visit focus: concerns in complex and/or prolonged anesthesia     Consultation:IM Perioperative Hospitalist OR NP     Patient  has previously scheduled Medical Appointment:NONE    Navigation: Tests Scheduled. TBD             Consults scheduled.TBD             Results will be tracked by Preop Clinic.  12/19 Medical optimization by Dr. Alvarez on 12/19. Labs resulted and reviewed by Dr. Rhonda Leopold..  Elena Sebastian RN BSN

## 2022-12-05 NOTE — TELEPHONE ENCOUNTER
----- Message from Elena Sebastian RN sent at 12/5/2022 11:07 AM CST -----  Surgery 12/122  Please schedule  and poc appt or NP, and labs.  He will be out of town 12/10-12/16. Try to schedule for 12/19.  Thanks!

## 2022-12-05 NOTE — PRE-PROCEDURE INSTRUCTIONS
Patient stated has not had any problem with anesthesia in the past. Will need medical optimization by  and poc appt or NP, and labs.Our  will call to set up these appts.    Preop instructions given. Hold aspirin, aspirin containing products, nsaids(aleve, advil, motrin, ibuprofen, naprosyn, naproxen, voltaren, diclofenac), vitamins and supplements one week prior to surgery.     May take Tylenol.( Sent to [facility] portal)  Verbalizes understanding.

## 2022-12-19 ENCOUNTER — ANESTHESIA EVENT (OUTPATIENT)
Dept: SURGERY | Facility: HOSPITAL | Age: 32
DRG: 516 | End: 2022-12-19
Payer: COMMERCIAL

## 2022-12-19 ENCOUNTER — HOSPITAL ENCOUNTER (OUTPATIENT)
Dept: PREADMISSION TESTING | Facility: HOSPITAL | Age: 32
Discharge: HOME | DRG: 516 | End: 2022-12-19
Attending: NEUROLOGICAL SURGERY | Admitting: NEUROLOGICAL SURGERY
Payer: COMMERCIAL

## 2022-12-19 ENCOUNTER — OFFICE VISIT (OUTPATIENT)
Dept: INTERNAL MEDICINE | Facility: CLINIC | Age: 32
End: 2022-12-19
Payer: COMMERCIAL

## 2022-12-19 VITALS
WEIGHT: 285.38 LBS | DIASTOLIC BLOOD PRESSURE: 66 MMHG | HEART RATE: 72 BPM | OXYGEN SATURATION: 95 % | BODY MASS INDEX: 40.86 KG/M2 | TEMPERATURE: 98 F | HEIGHT: 70 IN | SYSTOLIC BLOOD PRESSURE: 117 MMHG

## 2022-12-19 DIAGNOSIS — Z87.898 HISTORY OF PREDIABETES: ICD-10-CM

## 2022-12-19 DIAGNOSIS — R19.7 DIARRHEA, UNSPECIFIED TYPE: ICD-10-CM

## 2022-12-19 DIAGNOSIS — R73.03 PREDIABETES: ICD-10-CM

## 2022-12-19 DIAGNOSIS — K76.0 FATTY LIVER: ICD-10-CM

## 2022-12-19 DIAGNOSIS — Z86.79 S/P SCROTAL VARICOCELECTOMY: ICD-10-CM

## 2022-12-19 DIAGNOSIS — R06.83 SNORING: ICD-10-CM

## 2022-12-19 DIAGNOSIS — Z98.890 S/P SCROTAL VARICOCELECTOMY: ICD-10-CM

## 2022-12-19 DIAGNOSIS — F31.81 BIPOLAR 2 DISORDER: ICD-10-CM

## 2022-12-19 DIAGNOSIS — Z83.2 FAMILY HISTORY OF ITP: ICD-10-CM

## 2022-12-19 DIAGNOSIS — J45.909 CHILDHOOD ASTHMA, UNSPECIFIED ASTHMA SEVERITY, UNSPECIFIED WHETHER COMPLICATED, UNSPECIFIED WHETHER PERSISTENT: ICD-10-CM

## 2022-12-19 DIAGNOSIS — Z86.16 HISTORY OF COVID-19: ICD-10-CM

## 2022-12-19 DIAGNOSIS — Z01.818 PREOP EXAMINATION: Primary | ICD-10-CM

## 2022-12-19 DIAGNOSIS — R60.9 EDEMA, UNSPECIFIED TYPE: ICD-10-CM

## 2022-12-19 DIAGNOSIS — L30.9 ECZEMA, UNSPECIFIED TYPE: ICD-10-CM

## 2022-12-19 DIAGNOSIS — E66.01 MORBID OBESITY: ICD-10-CM

## 2022-12-19 PROCEDURE — 3008F PR BODY MASS INDEX (BMI) DOCUMENTED: ICD-10-PCS | Mod: CPTII,S$GLB,, | Performed by: HOSPITALIST

## 2022-12-19 PROCEDURE — 86920 COMPATIBILITY TEST SPIN: CPT | Performed by: NEUROLOGICAL SURGERY

## 2022-12-19 PROCEDURE — 1159F MED LIST DOCD IN RCRD: CPT | Mod: CPTII,S$GLB,, | Performed by: HOSPITALIST

## 2022-12-19 PROCEDURE — 1159F PR MEDICATION LIST DOCUMENTED IN MEDICAL RECORD: ICD-10-PCS | Mod: CPTII,S$GLB,, | Performed by: HOSPITALIST

## 2022-12-19 PROCEDURE — 99999 PR PBB SHADOW E&M-EST. PATIENT-LVL III: ICD-10-PCS | Mod: PBBFAC,,, | Performed by: HOSPITALIST

## 2022-12-19 PROCEDURE — 3078F DIAST BP <80 MM HG: CPT | Mod: CPTII,S$GLB,, | Performed by: HOSPITALIST

## 2022-12-19 PROCEDURE — 3074F SYST BP LT 130 MM HG: CPT | Mod: CPTII,S$GLB,, | Performed by: HOSPITALIST

## 2022-12-19 PROCEDURE — 3078F PR MOST RECENT DIASTOLIC BLOOD PRESSURE < 80 MM HG: ICD-10-PCS | Mod: CPTII,S$GLB,, | Performed by: HOSPITALIST

## 2022-12-19 PROCEDURE — 3074F PR MOST RECENT SYSTOLIC BLOOD PRESSURE < 130 MM HG: ICD-10-PCS | Mod: CPTII,S$GLB,, | Performed by: HOSPITALIST

## 2022-12-19 PROCEDURE — 99203 OFFICE O/P NEW LOW 30 MIN: CPT | Mod: S$GLB,,, | Performed by: HOSPITALIST

## 2022-12-19 PROCEDURE — 99999 PR PBB SHADOW E&M-EST. PATIENT-LVL III: CPT | Mod: PBBFAC,,, | Performed by: HOSPITALIST

## 2022-12-19 PROCEDURE — 99203 PR OFFICE/OUTPT VISIT, NEW, LEVL III, 30-44 MIN: ICD-10-PCS | Mod: S$GLB,,, | Performed by: HOSPITALIST

## 2022-12-19 PROCEDURE — 3008F BODY MASS INDEX DOCD: CPT | Mod: CPTII,S$GLB,, | Performed by: HOSPITALIST

## 2022-12-19 PROCEDURE — 1160F PR REVIEW ALL MEDS BY PRESCRIBER/CLIN PHARMACIST DOCUMENTED: ICD-10-PCS | Mod: CPTII,S$GLB,, | Performed by: HOSPITALIST

## 2022-12-19 PROCEDURE — 1160F RVW MEDS BY RX/DR IN RCRD: CPT | Mod: CPTII,S$GLB,, | Performed by: HOSPITALIST

## 2022-12-19 NOTE — ASSESSMENT & PLAN NOTE
He had longstanding diarrhea for which he had gastroenterology evaluation and had a colonoscopy study   To his understanding does not have any inflammatory bowel disease namely Crohn's disease or ulcerative colitis   It sounds like he has diverticulosis   He takes Imodium as needed about twice in a week  He stays hydrated  He passes light colored urine suggesting adequate hydration

## 2022-12-19 NOTE — ANESTHESIA PREPROCEDURE EVALUATION
[facility]  Anesthesia Pre-Operative Evaluation         Patient Name: Gurwinder Sainz  YOB: 1990  MRN: [id number]    SUBJECTIVE:     Pre-operative evaluation for Procedure(s) (LRB):  CRANIOTOMY for right frontal osteoma (Right)     12/19/2022    Gurwinder Sainz is a 32 y.o. male w/ a significant PMHx of morbid obesity, bipolar 2 disorder, childhood asthma, and osteoma of right frontal bone. He has great exercise tolerance, >4 mets. STOPBANG score 4/8, has not been diagnosed with NELLA.    Patient now presents for the above procedure(s).      Prev airway:   Method of Intubation: Direct laryngoscopy; Inserted by: CRNA; Airway Device: Endotracheal Tube; Mask Ventilation: Easy - oral; Intubated: Postinduction; Blade: Jose #2; Airway Device Size: 7.5; Style: Cuffed; Cuff Inflation: Minimal occlusive pressure; Inflation Amount: 6; Placement Verified By: Auscultation, Capnometry, ETT Condensation; Grade: Grade I; Complicating Factors: None; Intubation Findings: Positive EtCO2, Bilateral breath sounds, Atraumatic/Condition of teeth unchanged;  Depth of Insertion: 22; Securment: Teeth          Patient Active Problem List   Diagnosis    Diarrhea    History of COVID-19    Bipolar 2 disorder    Morbid obesity    Fatty liver    History of prediabetes    Eczema    Childhood asthma    Snoring    Family history of ITP    S/P scrotal varicocelectomy    Edema       Review of patient's allergies indicates:   Allergen Reactions    Covid-19 vaccine,omicron [id number],  mrna (pfizer)      Rash  Throat closing    Flagyl [metronidazole hcl]      Nausea, vomiting and diarrhea       Current Inpatient Medications:      Current Outpatient Medications on File Prior to Encounter   Medication Sig Dispense Refill    EScitalopram oxalate (LEXAPRO) 10 MG tablet Take 10 mg by mouth once daily.      lamoTRIgine (LAMICTAL) 100 MG tablet Take 100 mg by mouth once daily.      tirzepatide (MOUNJARO) 2.5  mg/0.5 mL PnIj Inject 2.5 mg into the skin every 7 days. TAKES ON THURSDAY       No current facility-administered medications on file prior to encounter.       Past Surgical History:   Procedure Laterality Date    COLONOSCOPY N/A 2018    Procedure: COLONOSCOPY;  Surgeon: DIEGO Walker MD;  Location: [facility] ([facility]);  Service: Endoscopy;  Laterality: N/A;    PENILE ADHESIONS LYSIS N/A 10/15/2019    Procedure: LYSIS, ADHESIONS, PENIS;  Surgeon: Bjorn Rudd MD;  Location: [facility] OR [address] [place] [address];  Service: Urology;  Laterality: N/A;    testicular torsion procedure      TONSILLECTOMY      upper wisdom teeth      VARICOCELECTOMY Left 10/15/2019    Procedure: EXCISION, VARICOCELE;  Surgeon: Bjorn Rudd MD;  Location: [address];  Service: Urology;  Laterality: Left;  1.5hr  operating microscope       Social History     Socioeconomic History    Marital status:    Tobacco Use    Smoking status: Former     Packs/day: 1.00     Years: 2.00     Pack years: 2.00     Types: Cigarettes     Quit date: 2015     Years since quittin.3    Smokeless tobacco: Never   Substance and Sexual Activity    Alcohol use: Yes     Comment: once a month    Drug use: No    Sexual activity: Yes     Partners: Female       OBJECTIVE:     Vital Signs Range (Last 24H):  Temp:  [36.9 °C (98.4 °F)]   Pulse:  [72]   BP: (117)/(66)   SpO2:  [95 %]       Significant Labs:  Lab Results   Component Value Date    WBC 9.03 04/10/2018    HGB 15.6 04/10/2018    HCT 46.3 04/10/2018     04/10/2018    ALT 26 04/10/2018    AST 21 04/10/2018     04/10/2018    K 3.9 04/10/2018     04/10/2018    CREATININE 0.9 04/10/2018    BUN 14 04/10/2018    CO2 26 04/10/2018       Diagnostic Studies: No relevant studies.    EKG:   No results found for this or any previous visit.    2D ECHO:  TTE:  No results found for this or any previous visit.    ERIK:  No results found for this or any previous  visit.    ASSESSMENT/PLAN:                                                                                                                  12/19/2022  Gurwinder Sainz is a 32 y.o., male.      Pre-op Assessment    I have reviewed the Patient Summary Reports.     I have reviewed the Nursing Notes. I have reviewed the NPO Status.   I have reviewed the Medications.     Review of Systems  Anesthesia Hx:  History of prior surgery of interest to airway management or planning: Denies Family Hx of Anesthesia complications.   Denies Personal Hx of Anesthesia complications.   Social:  Former Smoker, Social Alcohol Use    Hematology/Oncology:  Hematology Normal   Oncology Normal     EENT/Dental:EENT/Dental Normal   Cardiovascular:  Cardiovascular Normal   Denies Angina.  Functional Capacity 4 METS    Pulmonary:  Pulmonary Normal  Denies Shortness of breath.  Denies Recent URI.  Denies Sleep Apnea.    Hepatic/GI:  Hepatic/GI Normal    Musculoskeletal:  Musculoskeletal Normal    Neurological:  Neurology Normal    Endocrine:  Endocrine Normal  Morbid Obesity / BMI > 40  Psych:   Psychiatric History          Physical Exam  General: Well nourished, Cooperative, Alert and Oriented    Airway:  Mallampati: III / II  Mouth Opening: Normal  TM Distance: Normal  Tongue: Normal  Neck ROM: Normal ROM    Dental:  Intact        Anesthesia Plan  Type of Anesthesia, risks & benefits discussed:    Anesthesia Type: Gen ETT  Intra-op Monitoring Plan: Standard ASA Monitors and Art Line  Post Op Pain Control Plan: multimodal analgesia  Induction:  IV  Airway Plan: Direct, Post-Induction  Informed Consent: Informed consent signed with the Patient and all parties understand the risks and agree with anesthesia plan.  All questions answered.   ASA Score: 3  Day of Surgery Review of History & Physical: H&P Update referred to the surgeon/provider.  Anesthesia Plan Notes: - Minimize use of sedating Medications- Opioid/Benzodiazepine   - Avoid use of  excessive Fio2 as it can reduce the hypoxic respiratory drive   -Consider Empirical treatment with CPAP, if there is high index of suspicion for sleep apnea    Ready For Surgery From Anesthesia Perspective.     .

## 2022-12-19 NOTE — ASSESSMENT & PLAN NOTE
Edema- I suggested avoidance of added salt,avoidance of NSAID's, unless advised or ordered  and suggested Limb elevation and justyn hose use

## 2022-12-19 NOTE — ASSESSMENT & PLAN NOTE
To his understanding he does have fatty liver   I looked at the available lab studies that I have in the electronic health records and noticed that he does not have an elevated liver enzymes suggesting that he does not have hepatitis      No history  of cirrhosis of liver or suggestions of Liver  decompensation   No Jaundice , dark urine , pale stool , no easy bleeding or bruising  No Unexplained itching    I suggested weight loss which he is working on and follow-up      He does not drink alcohol in excess amounts and drinks about a drink in 2- 3 months

## 2022-12-19 NOTE — HPI
History of present illness- I had the pleasure of meeting this pleasant 32 y.o. gentleman in the pre op clinic prior to his elective  Neuro  surgery. The patient is new to me .   I have offered to have his family member on the phone during the consultation process    I have obtained the history by speaking to the patient and by reviewing the electronic health records.    Events leading up to surgery / History of presenting illness -    Skull lesion    I have obtained the history by speaking to the patient   He has a swelling on the right temple area that he was 1st made aware of by his  at around age of 17 years of age   He got that checked out in 2017 after a acquired health insurance and was told that this was not cancer     Over the years people would notices swelling on his right temple area   When he would wear a hat the the hat line would  rub on the swelling   He does not have any pain from this   He eventually wanted to address this and is planning to have surgery   This does not seem to be causing him much inconvenience       Relevant health conditions of significance for the perioperative period/ History of presenting illness -    Subjectively describes health as Excellent       Health conditions of significance for the perioperative period      - Morbid obesity   - Bipolar type 2   - History of COVID 19   - Prediabetes  - Diarrhea    Not known to have heart disease , Diabetes Mellitus, Lung disease , HTN, deep vein thrombosis or pulmonary embolism      He lives with his wife and his daughter who will be about 2 years soon and a dog and a cat  He lives in a single-level house  He works as a

## 2022-12-19 NOTE — OUTPATIENT SUBJECTIVE & OBJECTIVE
"Outpatient Subjective & Objective     Chief complaint-Preoperative evaluation, Perioperative Medical management, complication reduction plan     Active cardiac conditions- none    Revised cardiac risk index predictors- none    Functional capacity -Examples of physical activity , he walked a lot on his recent trip to [place] world, Grocery ,cooking, played with his daughter , works out  , free weights, elliptical, house work and can take 1 flight of stairs----- He can undertake all the above activities without  chest pain,chest tightness,,dizziness,lightheadedness making his exercise tolerance more,   than 4 Mets.   Winded on exercising , not new , attributes to weight improved since he lost weight  Review of Systems   Constitutional:  Negative for chills and fever.   HENT:          STOPBANG score  4/ 8    Snoring   BMI over 35   Neck size over 40 CM  Male gender   Eyes:         No unusual vision changes   Respiratory:          Does not feel sick   Dry cough   No Hemoptysis   Cardiovascular:         As noted   Gastrointestinal:         Bowels- Regular   No overt GI/ blood losses   Endocrine:        Prednisone use > 20 mg daily for 3 weeks- none   Genitourinary:  Negative for dysuria.        No urinary hesitancy    Musculoskeletal:           No unusual muscle/ joint pains   Skin:  Negative for rash.   Neurological:  Negative for syncope.        No unilateral weakness   Hematological:         Current use of Anticoagulants  None   Psychiatric/Behavioral:            No SI/HI   No vascular stenting          No anesthesia, bleeding, cardiac, PONV problems with previous surgeries/procedures.  Medications and Allergies reviewed in epic.     FH- No anesthesia, / venous thrombosis , early onset heart disease in family          Physical Exam  Blood pressure 117/66, pulse 72, temperature 98.4 °F (36.9 °C), temperature source Oral, height 5' 10" (1.778 m), weight 129.5 kg (285 lb 6.4 oz), SpO2 95 %.        Physical " Exam  Constitutional- Vitals - Body mass index is 40.95 kg/m².,   Vitals:    12/19/22 0815   BP: 117/66   Pulse: 72   Temp: 98.4 °F (36.9 °C)     General appearance-Conscious,Coherent  Eyes- No conjunctival icterus,pupils  round  and reactive to light   ENT-Oral cavity- moist    , Hearing grossly normal   Neck- No thyromegaly ,Trachea -central, No jugular venous distension,   No Carotid Bruit   Cardiovascular -Heart Sounds- Normal  and  no murmur   , No gallop rhythm   Respiratory - Normal Respiratory Effort, Normal breath sounds,  no wheeze , and  no forced expiratory wheeze    Peripheral pitting pedal edema-- mild, no calf pain   Gastrointestinal -Soft abdomen, No palpable masses, Non Tender,Liver,Spleen not palpable. No-- free fluid and shifting dullness  Musculoskeletal- No finger Clubbing. Strength grossly normal   Lymphatic-No Palpable cervical, axillary,Inguinal lymphadenopathy   Psychiatric - normal effect,Orientation  Rt Dorsalis pedis pulses-palpable    Lt Dorsalis pedis pulses- palpable   Rt Posterior tibial pulses -palpable   Left posterior tibial pulses -palpable   Miscellaneous -  no asterixis,  no dupuytren's contracture,  no renal bruit, and  Tattoo   Investigations  Lab and Imaging have been reviewed in epic.    Review of Medicine tests     I reviewed the cardiac monitoring strips from 2019 which showed that he was in sinus rhythm    Review of clinical lab tests:    I reviewed the lab studies from November the 16 2022 showed a creatinine of 0.87 and normal liver enzymes  I reviewed the complete blood count from the same day which showed a normal hemoglobin, hematocrit and platelet count             Review of old records- Was done and information gathered regards to events leading to surgery and health conditions of significance in the perioperative period.    Outpatient Subjective & Objective

## 2022-12-19 NOTE — ASSESSMENT & PLAN NOTE
He is currently taking GLP-1 receptor agonist   He is currently taking Mounjaro which is expensive and there is a plan for him to change to another agent at some point in future    He was weighing about 325 lb about 4 5 months ago and he was able to come down to 288 and since he start taking the GLP 1 receptor agonist he was able to come down to 275   He has gained some weight after has been on a vacation lately    Weight related conditions     Known to have       Pre  Diabetes   Fatty liver     Not troubled with / Not known to have     HTN  Type 2 Diabetes   Hyperlipidemia   Sleep apnea   Acid reflux   Osteoarthritis    Encouraged weight loss

## 2022-12-19 NOTE — PROGRESS NOTES
[facility]  Progress Note    Patient Name: Gurwinder Sainz  MRN: [id number]  Date of Evaluation- 12/21/2022  PCP- Vicky Bravo MD    Future cases for Gurwinder Sainz [[id number]]       Case ID Status Date Time [place] Procedure Provider Location    [id number] [facility] 12/22/2022  7:00  CRANIOTOMY for right frontal osteoma Adal Galdamez MD [[id number]] [facility] OR 2ND FLR            HPI:  History of present illness- I had the pleasure of meeting this pleasant 32 y.o. gentleman in the pre op clinic prior to his elective  Neuro  surgery. The patient is new to me .   I have offered to have his family member on the phone during the consultation process    I have obtained the history by speaking to the patient and by reviewing the electronic health records.    Events leading up to surgery / History of presenting illness -    Skull lesion    I have obtained the history by speaking to the patient   He has a swelling on the right temple area that he was 1st made aware of by his  at around age of 17 years of age   He got that checked out in 2017 after a acquired health insurance and was told that this was not cancer     Over the years people would notices swelling on his right temple area   When he would wear a hat the the hat line would  rub on the swelling   He does not have any pain from this   He eventually wanted to address this and is planning to have surgery   This does not seem to be causing him much inconvenience       Relevant health conditions of significance for the perioperative period/ History of presenting illness -    Subjectively describes health as Excellent       Health conditions of significance for the perioperative period      - Morbid obesity   - Bipolar type 2   - History of COVID 19   - Prediabetes  - Diarrhea    Not known to have heart disease , Diabetes Mellitus, Lung disease , HTN, deep vein thrombosis or pulmonary embolism      He lives with his wife and his daughter who will be  about 2 years soon and a dog and a cat  He lives in a single-level house  He works as a                  Subjective/ Objective:     Chief complaint-Preoperative evaluation, Perioperative Medical management, complication reduction plan     Active cardiac conditions- none    Revised cardiac risk index predictors- none    Functional capacity -Examples of physical activity , he walked a lot on his recent trip to [place], Grocery ,cooking, played with his daughter , works out  , free weights, elliptical, house work and can take 1 flight of stairs----- He can undertake all the above activities without  chest pain,chest tightness,,dizziness,lightheadedness making his exercise tolerance more,   than 4 Mets.   Winded on exercising , not new , attributes to weight improved since he lost weight  Review of Systems   Constitutional:  Negative for chills and fever.   HENT:          STOPBANG score  4/ 8    Snoring   BMI over 35   Neck size over 40 CM  Male gender   Eyes:         No unusual vision changes   Respiratory:          Does not feel sick   Dry cough   No Hemoptysis   Cardiovascular:         As noted   Gastrointestinal:         Bowels- Regular   No overt GI/ blood losses   Endocrine:        Prednisone use > 20 mg daily for 3 weeks- none   Genitourinary:  Negative for dysuria.        No urinary hesitancy    Musculoskeletal:           No unusual muscle/ joint pains   Skin:  Negative for rash.   Neurological:  Negative for syncope.        No unilateral weakness   Hematological:         Current use of Anticoagulants  None   Psychiatric/Behavioral:            No SI/HI   No vascular stenting          No anesthesia, bleeding, cardiac, PONV problems with previous surgeries/procedures.  Medications and Allergies reviewed in epic.     FH- No anesthesia, / venous thrombosis , early onset heart disease in family          Physical Exam  Blood pressure 117/66, pulse 72, temperature 98.4 °F (36.9 °C), temperature  "source Oral, height 5' 10" (1.778 m), weight 129.5 kg (285 lb 6.4 oz), SpO2 95 %.        Physical Exam  Constitutional- Vitals - Body mass index is 40.95 kg/m².,   Vitals:    12/19/22 0815   BP: 117/66   Pulse: 72   Temp: 98.4 °F (36.9 °C)     General appearance-Conscious,Coherent  Eyes- No conjunctival icterus,pupils  round  and reactive to light   ENT-Oral cavity- moist    , Hearing grossly normal   Neck- No thyromegaly ,Trachea -central, No jugular venous distension,   No Carotid Bruit   Cardiovascular -Heart Sounds- Normal  and  no murmur   , No gallop rhythm   Respiratory - Normal Respiratory Effort, Normal breath sounds,  no wheeze , and  no forced expiratory wheeze    Peripheral pitting pedal edema-- mild, no calf pain   Gastrointestinal -Soft abdomen, No palpable masses, Non Tender,Liver,Spleen not palpable. No-- free fluid and shifting dullness  Musculoskeletal- No finger Clubbing. Strength grossly normal   Lymphatic-No Palpable cervical, axillary,Inguinal lymphadenopathy   Psychiatric - normal effect,Orientation  Rt Dorsalis pedis pulses-palpable    Lt Dorsalis pedis pulses- palpable   Rt Posterior tibial pulses -palpable   Left posterior tibial pulses -palpable   Miscellaneous -  no asterixis,  no dupuytren's contracture,  no renal bruit, and  Tattoo   Investigations  Lab and Imaging have been reviewed in [facility].    Review of Medicine tests     I reviewed the cardiac monitoring strips from 2019 which showed that he was in sinus rhythm    Review of clinical lab tests:    I reviewed the lab studies from November the 16 2022 showed a creatinine of 0.87 and normal liver enzymes  I reviewed the complete blood count from the same day which showed a normal hemoglobin, hematocrit and platelet count             Review of old records- Was done and information gathered regards to events leading to surgery and health conditions of significance in the perioperative period.        Preoperative cardiac risk " assessment-  The patient does not have any active cardiac conditions . Revised cardiac risk index predictors- 0 ---.Functional capacity is more than 4 Mets. He will be undergoing a Neuro procedure that carries a Moderate Risk risk     Risk of a major Cardiac event ( Defined as death, myocardial infarction, or cardiac arrest at 30 days after noncardiac surgery), based on RCRI score     3.9%       No further cardiac work up is indicated prior to proceeding with the surgery     Orders Placed This Encounter    Ambulatory referral/consult to Sleep Disorders       American Society of Anesthesiologists Physical status classification ( ASA ) class: 3     Postoperative pulmonary complication risk assessment:    \\     Reji Respiratory failure index- percentage risk of respiratory failure: 1.8 %    Assessment/Plan:     Bipolar 2 disorder  He is currently taking Lamictal and escitalopram   He is under the care of a psychiatrist     He feels very good about this     I suggest monitoring the sodium as SIADH from Es citalopram   use and hypersecretion of ADH associated with surgery can reduce sodium in the perioperative period    History of COVID-19  He had COVID-19 in 2022   He approximately had COVID towards end of August 2022  His symptoms for shortness of breath, fever, cough, sweating  He did not require hospitalization, intubation, supplemental oxygen  His wife and daughter had COVID at that time also  They have since recovered  He does not have any problem with his breathing  He has been vaccinated with the 2 COVID-19 vaccines     He had bad rash and feeling of throat closing with the 1st vaccine   He had problem with the Pfizer COVID vaccine    He received the Garret and Garret vaccine after he had problem with the Pfizer vaccine      Morbid obesity  He is currently taking GLP-1 receptor agonist   He is currently taking Mounjaro which is expensive and there is a plan for him to change to another agent at some point  in future    He was weighing about 325 lb about 4 5 months ago and he was able to come down to 288 and since he start taking the GLP 1 receptor agonist he was able to come down to 275   He has gained some weight after has been on a vacation lately    Weight related conditions     Known to have       Pre  Diabetes   Fatty liver     Not troubled with / Not known to have     HTN  Type 2 Diabetes   Hyperlipidemia   Sleep apnea   Acid reflux   Osteoarthritis    Encouraged weight loss      Fatty liver  To his understanding he does have fatty liver   I looked at the available lab studies that I have in the electronic health records and noticed that he does not have an elevated liver enzymes suggesting that he does not have hepatitis      No history  of cirrhosis of liver or suggestions of Liver  decompensation   No Jaundice , dark urine , pale stool , no easy bleeding or bruising  No Unexplained itching    I suggested weight loss which he is working on and follow-up      He does not drink alcohol in excess amounts and drinks about a drink in 2- 3 months    History of prediabetes  He had history of prediabetes that was diagnosed in 2010 and he was on metformin at some point  He stopped metformin in 2011 after losing weight    His most recent hemoglobin A1c is 4.5 from November of 2022     I suggest monitoring the glucose in the perioperative period as  glucose may be high from stress hyperglycemia in which case Insulin may be required    Hemoglobin A1c in the pre diabetic range   Weight loss with diet and exercise , if able helps lower A1c  Increased risk of becoming a diabetic   Needs follow up         Diarrhea  He had longstanding diarrhea for which he had gastroenterology evaluation and had a colonoscopy study   To his understanding does not have any inflammatory bowel disease namely Crohn's disease or ulcerative colitis   It sounds like he has diverticulosis   He takes Imodium as needed about twice in a week  He stays  hydrated  He passes light colored urine suggesting adequate hydration    Eczema  He is doing good with eczema  To his knowledge he is not allergic to tape    Childhood asthma  He grew out of asthma and has not had any problem with asthma lately    Snoring    Possible sleep apnea- I suggest a sleep study and suggest caution with usage of medication that can cause respiratory suppression in the perioperative period  potential ramifications of untreated sleep apnea, which could include daytime sleepiness, hypertension, heart disease and stroke were discussed    Avoidance of  supine sleep, weight gain , alcoholic beverages , care with , sedative , CNS depressant use indicated  since all of these can worsen NELLA     Suggested care with sedating medication    He is interested in having a sleep evaluation and I have requested a sleep evaluation for him   He plans on having it done after surgery    His oxygen saturation today was 95% which is likely from possible sleep apnea and  I have requested a sleep evaluation    Pulmonary optimization measures    I suggest the following to help with the pulmonary status in the perioperative period     Preoperative period     - Increased Physical activity   - Mucolytic / Expectorant treatment  - Mucinex  - Weight  loss that helps the pulmonary status- ( if applicable )   - Addressing Possible sleep apnea-       Intra / post operative period     - Minimize use of sedating Medication- Opioid/ Benzodiazepine   - Consider use of regional block , to reduce the need for opioid treatment  - Consider opioid sparing anesthesia/Analgesia   - Avoid use of excessive Fio2 as it can reduce the hypoxic respiratory drive   -Consider Empirical treatment with CPAP, if there is high index of suspicion for sleep apnea/unable to have sleep study prior to surgery   - Consult Respiratory therapy , Physical therapy   - Cough, Deep breathing exercises   - Early ambulation   - Out of bed to chair   - Incentive  spirometer use   -  Oral care , head end of bed elevation, Nutrition   - Oxygen saturation, End tidal CO2 monitoring    - Having patient in a monitored care area so that hemodynamics , respiratory status can be monitored            Family history of ITP  Mother has a history of ITP   He does not have a history of ITP or any problem with platelets    S/P scrotal varicocelectomy  He had this in 2019 had had a child soon after    Edema      Edema- I suggested avoidance of added salt,avoidance of NSAID's, unless advised or ordered  and suggested Limb elevation and justyn hose use        Preventive perioperative care    Thromboembolic prophylaxis:  His risk factors for thrombosis include surgical procedure.I suggest  thromboembolic prophylaxis ( mechanical/pharmacological, weighing the risk benefits of pharmacological agent use considering gamaliel procedural bleeding )  during the perioperative period.I suggested being active in the post operative period.      Postoperative pulmonary complication prophylaxis-Risk factors for post operative pulmonary complications include ASA class >2- I suggest incentive spirometry use, early ambulation, and end tidal carbon dioxide monitoring  , oral care , head end of bed elevation      Renal complication prophylaxis-I suggest keeping him well hydrated  in the perioperative period.     Surgical site Infection Prophylaxis-I  suggest appropriate antibiotic for Prophylaxis against Surgical site infections  No reported Staph infection  Skin antibacterial discussed         This visit was focused on Preoperative evaluation, Perioperative Medical management, complication reduction plans. I suggest that the patient follows up with primary care or relevant sub specialists for ongoing health care.    I appreciate the opportunity to be involved in this patients care. Please feel free to contact me if there were any questions about this consultation.    Patient is optimized    Patient/ care giver/  Family member was instructed to call and update me about any changes to health,  medication, office visits ,testing out side of the [facility] operative care center , hospitalizations between now and surgery      Mckenzie Alvarez MD  Internal Medicine  [facility]   Cell Phone- [phone]      COVID screening     No fever   No SOB  No sore throat   No loss of taste or smell   No muscle aches   No nausea, vomiting , diarrhea     He is known to have slow heartbeat that is not causing him any problems and his most recent thyroid function test is normal  --  12/20/2022- 14 58     Followed up on the labs    INR is normal  Creatinine is stable  Liver function test is normal  Hemoglobin count is mildly low    Called to discuss lab  Suggested follow up   No visible blood losses  Not on NSAID    Called to follow up , spoke to him to address any concerns with the up coming surgery or any questions on Medication instructions -  Doing well ,No changes to Medication, Health -  Has cough, with phlegm  No fever  Does not feel like COVID like he did in the past  Plans on getting COVID test  Wants to use Mucinex  --  12/21- 10 18       Correspondence from patient about Mucinex  20mL every 4hr as needed for cough   --  12/21/2022- 12 59     Called to follow up , spoke to to address any concerns with the up coming surgery or any questions on Medication instructions -  Doing well   Using Mucinex- helping expectoration   No wheezing   Feeling great - ready for surgery  No fever, no chill   Tickle in the throat - occasional phlegm-getting lighter in color  No hemoptysis  Does not feel like flu -  Home COVID test Negative   yesterday  Recently been to [place] - may be mild viral Respiratory illness     No wheezing , No SOB  No chest pain  Wants to proceed with surgery

## 2022-12-19 NOTE — ASSESSMENT & PLAN NOTE
He is currently taking Lamictal and escitalopram   He is under the care of a psychiatrist     He feels very good about this     I suggest monitoring the sodium as SIADH from Es citalopram   use and hypersecretion of ADH associated with surgery can reduce sodium in the perioperative period

## 2022-12-19 NOTE — ASSESSMENT & PLAN NOTE
Possible sleep apnea- I suggest a sleep study and suggest caution with usage of medication that can cause respiratory suppression in the perioperative period  potential ramifications of untreated sleep apnea, which could include daytime sleepiness, hypertension, heart disease and stroke were discussed    Avoidance of  supine sleep, weight gain , alcoholic beverages , care with , sedative , CNS depressant use indicated  since all of these can worsen NELLA     Suggested care with sedating medication    He is interested in having a sleep evaluation and I have requested a sleep evaluation for him   He plans on having it done after surgery    His oxygen saturation today was 95% which is likely from possible sleep apnea and  I have requested a sleep evaluation    Pulmonary optimization measures    I suggest the following to help with the pulmonary status in the perioperative period     Preoperative period     - Increased Physical activity   - Mucolytic / Expectorant treatment  - Mucinex  - Weight  loss that helps the pulmonary status- ( if applicable )   - Addressing Possible sleep apnea-       Intra / post operative period     - Minimize use of sedating Medication- Opioid/ Benzodiazepine   - Consider use of regional block , to reduce the need for opioid treatment  - Consider opioid sparing anesthesia/Analgesia   - Avoid use of excessive Fio2 as it can reduce the hypoxic respiratory drive   -Consider Empirical treatment with CPAP, if there is high index of suspicion for sleep apnea/unable to have sleep study prior to surgery   - Consult Respiratory therapy , Physical therapy   - Cough, Deep breathing exercises   - Early ambulation   - Out of bed to chair   - Incentive spirometer use   -  Oral care , head end of bed elevation, Nutrition   - Oxygen saturation, End tidal CO2 monitoring    - Having patient in a monitored care area so that hemodynamics , respiratory status can be monitored

## 2022-12-19 NOTE — ASSESSMENT & PLAN NOTE
He had history of prediabetes that was diagnosed in 2010 and he was on metformin at some point  He stopped metformin in 2011 after losing weight    His most recent hemoglobin A1c is 4.5 from November of 2022     I suggest monitoring the glucose in the perioperative period as  glucose may be high from stress hyperglycemia in which case Insulin may be required    Hemoglobin A1c in the pre diabetic range   Weight loss with diet and exercise , if able helps lower A1c  Increased risk of becoming a diabetic   Needs follow up

## 2022-12-19 NOTE — ASSESSMENT & PLAN NOTE
He had COVID-19 in 2022   He approximately had COVID towards end of August 2022  His symptoms for shortness of breath, fever, cough, sweating  He did not require hospitalization, intubation, supplemental oxygen  His wife and daughter had COVID at that time also  They have since recovered  He does not have any problem with his breathing  He has been vaccinated with the 2 COVID-19 vaccines     He had bad rash and feeling of throat closing with the 1st vaccine   He had problem with the Pfizer COVID vaccine    He received the Garret and Garret vaccine after he had problem with the Pfizer vaccine

## 2022-12-20 ENCOUNTER — PATIENT MESSAGE (OUTPATIENT)
Dept: INTERNAL MEDICINE | Facility: CLINIC | Age: 32
End: 2022-12-20
Payer: COMMERCIAL

## 2022-12-21 ENCOUNTER — TELEPHONE (OUTPATIENT)
Dept: NEUROSURGERY | Facility: CLINIC | Age: 32
End: 2022-12-21
Payer: COMMERCIAL

## 2022-12-21 NOTE — TELEPHONE ENCOUNTER
Patient contacted. Advised to arrive for surgery at 11:30, DOSC, NPO after MN, clear fluids until 6:30am, shower x 2 with Hibiclens or Dial antibacterial soap. Understanding verbalized by patient.

## 2022-12-22 ENCOUNTER — ANESTHESIA (OUTPATIENT)
Dept: SURGERY | Facility: HOSPITAL | Age: 32
DRG: 516 | End: 2022-12-22
Payer: COMMERCIAL

## 2022-12-22 ENCOUNTER — HOSPITAL ENCOUNTER (INPATIENT)
Facility: HOSPITAL | Age: 32
LOS: 1 days | Discharge: HOME | DRG: 516 | End: 2022-12-23
Attending: NEUROLOGICAL SURGERY | Admitting: NEUROLOGICAL SURGERY
Payer: COMMERCIAL

## 2022-12-22 DIAGNOSIS — M89.8X8 SKULL MASS: ICD-10-CM

## 2022-12-22 PROCEDURE — 63600175 PHARM REV CODE 636 W HCPCS: Performed by: NURSE ANESTHETIST, CERTIFIED REGISTERED

## 2022-12-22 PROCEDURE — 25000003 PHARM REV CODE 250: Performed by: STUDENT IN AN ORGANIZED HEALTH CARE EDUCATION/TRAINING PROGRAM

## 2022-12-22 PROCEDURE — D9220A PRA ANESTHESIA: ICD-10-PCS | Mod: ANES,,, | Performed by: STUDENT IN AN ORGANIZED HEALTH CARE EDUCATION/TRAINING PROGRAM

## 2022-12-22 PROCEDURE — P9016 RBC LEUKOCYTES REDUCED: HCPCS | Performed by: NEUROLOGICAL SURGERY

## 2022-12-22 PROCEDURE — D9220A PRA ANESTHESIA: Mod: CRNA,,, | Performed by: NURSE ANESTHETIST, CERTIFIED REGISTERED

## 2022-12-22 PROCEDURE — 63600175 PHARM REV CODE 636 W HCPCS: Performed by: STUDENT IN AN ORGANIZED HEALTH CARE EDUCATION/TRAINING PROGRAM

## 2022-12-22 PROCEDURE — 88311 PR  DECALCIFY TISSUE: ICD-10-PCS | Mod: 26,,, | Performed by: STUDENT IN AN ORGANIZED HEALTH CARE EDUCATION/TRAINING PROGRAM

## 2022-12-22 PROCEDURE — D9220A PRA ANESTHESIA: ICD-10-PCS | Mod: CRNA,,, | Performed by: NURSE ANESTHETIST, CERTIFIED REGISTERED

## 2022-12-22 PROCEDURE — 88311 DECALCIFY TISSUE: CPT | Performed by: STUDENT IN AN ORGANIZED HEALTH CARE EDUCATION/TRAINING PROGRAM

## 2022-12-22 PROCEDURE — 71000016 HC POSTOP RECOV ADDL HR: Performed by: NEUROLOGICAL SURGERY

## 2022-12-22 PROCEDURE — 71000033 HC RECOVERY, INTIAL HOUR: Performed by: NEUROLOGICAL SURGERY

## 2022-12-22 PROCEDURE — 11000001 HC ACUTE MED/SURG PRIVATE ROOM

## 2022-12-22 PROCEDURE — 63707 PR REPR,DURAL/CSF LEAK,NOT REQ LAMINECTOMY: ICD-10-PCS | Mod: 51,,, | Performed by: NEUROLOGICAL SURGERY

## 2022-12-22 PROCEDURE — 88305 TISSUE EXAM BY PATHOLOGIST: CPT | Performed by: STUDENT IN AN ORGANIZED HEALTH CARE EDUCATION/TRAINING PROGRAM

## 2022-12-22 PROCEDURE — 37000009 HC ANESTHESIA EA ADD 15 MINS: Performed by: NEUROLOGICAL SURGERY

## 2022-12-22 PROCEDURE — 63600175 PHARM REV CODE 636 W HCPCS

## 2022-12-22 PROCEDURE — 61781 SCAN PROC CRANIAL INTRA: CPT | Mod: ,,, | Performed by: NEUROLOGICAL SURGERY

## 2022-12-22 PROCEDURE — 94761 N-INVAS EAR/PLS OXIMETRY MLT: CPT

## 2022-12-22 PROCEDURE — 25000003 PHARM REV CODE 250: Performed by: NEUROLOGICAL SURGERY

## 2022-12-22 PROCEDURE — 36000710: Performed by: NEUROLOGICAL SURGERY

## 2022-12-22 PROCEDURE — 88311 DECALCIFY TISSUE: CPT | Mod: 26,,, | Performed by: STUDENT IN AN ORGANIZED HEALTH CARE EDUCATION/TRAINING PROGRAM

## 2022-12-22 PROCEDURE — 61500 PR CRANIECT EXCIS SKULL BONE LESN: ICD-10-PCS | Mod: ,,, | Performed by: NEUROLOGICAL SURGERY

## 2022-12-22 PROCEDURE — 27201423 OPTIME MED/SURG SUP & DEVICES STERILE SUPPLY: Performed by: NEUROLOGICAL SURGERY

## 2022-12-22 PROCEDURE — C1713 ANCHOR/SCREW BN/BN,TIS/BN: HCPCS | Performed by: NEUROLOGICAL SURGERY

## 2022-12-22 PROCEDURE — D9220A PRA ANESTHESIA: Mod: ANES,,, | Performed by: STUDENT IN AN ORGANIZED HEALTH CARE EDUCATION/TRAINING PROGRAM

## 2022-12-22 PROCEDURE — 37000008 HC ANESTHESIA 1ST 15 MINUTES: Performed by: NEUROLOGICAL SURGERY

## 2022-12-22 PROCEDURE — 71000015 HC POSTOP RECOV 1ST HR: Performed by: NEUROLOGICAL SURGERY

## 2022-12-22 PROCEDURE — 61500 CRNEC EXC TUM/BONE LES SKULL: CPT | Mod: ,,, | Performed by: NEUROLOGICAL SURGERY

## 2022-12-22 PROCEDURE — 88305 TISSUE EXAM BY PATHOLOGIST: CPT | Mod: 26,,, | Performed by: STUDENT IN AN ORGANIZED HEALTH CARE EDUCATION/TRAINING PROGRAM

## 2022-12-22 PROCEDURE — 63707 REPAIR SPINAL FLUID LEAKAGE: CPT | Mod: 51,,, | Performed by: NEUROLOGICAL SURGERY

## 2022-12-22 PROCEDURE — 63600175 PHARM REV CODE 636 W HCPCS: Performed by: NEUROLOGICAL SURGERY

## 2022-12-22 PROCEDURE — 25000003 PHARM REV CODE 250: Performed by: NURSE ANESTHETIST, CERTIFIED REGISTERED

## 2022-12-22 PROCEDURE — 88305 TISSUE EXAM BY PATHOLOGIST: ICD-10-PCS | Mod: 26,,, | Performed by: STUDENT IN AN ORGANIZED HEALTH CARE EDUCATION/TRAINING PROGRAM

## 2022-12-22 PROCEDURE — 36000711: Performed by: NEUROLOGICAL SURGERY

## 2022-12-22 PROCEDURE — 36415 COLL VENOUS BLD VENIPUNCTURE: CPT | Performed by: NEUROLOGICAL SURGERY

## 2022-12-22 PROCEDURE — 61781 PR STEREOTACTIC COMP ASSIST PROC,CRANIAL,INTRADURAL: ICD-10-PCS | Mod: ,,, | Performed by: NEUROLOGICAL SURGERY

## 2022-12-22 DEVICE — IMPLANTABLE DEVICE: Type: IMPLANTABLE DEVICE | Site: SCALP | Status: FUNCTIONAL

## 2022-12-22 DEVICE — SCREW UN3 AXS SD 1.5X4MM: Type: IMPLANTABLE DEVICE | Site: SCALP | Status: FUNCTIONAL

## 2022-12-22 RX ORDER — LIDOCAINE HYDROCHLORIDE AND EPINEPHRINE 10; 10 MG/ML; UG/ML
INJECTION, SOLUTION INFILTRATION; PERINEURAL
Status: DISCONTINUED | OUTPATIENT
Start: 2022-12-22 | End: 2022-12-22 | Stop reason: HOSPADM

## 2022-12-22 RX ORDER — LIDOCAINE HYDROCHLORIDE 20 MG/ML
INJECTION, SOLUTION EPIDURAL; INFILTRATION; INTRACAUDAL; PERINEURAL
Status: DISCONTINUED | OUTPATIENT
Start: 2022-12-22 | End: 2022-12-22

## 2022-12-22 RX ORDER — DEXMEDETOMIDINE HYDROCHLORIDE 100 UG/ML
INJECTION, SOLUTION INTRAVENOUS
Status: DISCONTINUED | OUTPATIENT
Start: 2022-12-22 | End: 2022-12-22

## 2022-12-22 RX ORDER — ACETAMINOPHEN 325 MG/1
650 TABLET ORAL EVERY 6 HOURS PRN
Status: DISCONTINUED | OUTPATIENT
Start: 2022-12-22 | End: 2022-12-23 | Stop reason: HOSPADM

## 2022-12-22 RX ORDER — PHENYLEPHRINE HYDROCHLORIDE 10 MG/ML
INJECTION INTRAVENOUS
Status: DISCONTINUED | OUTPATIENT
Start: 2022-12-22 | End: 2022-12-22

## 2022-12-22 RX ORDER — OXYCODONE HYDROCHLORIDE 5 MG/1
5 TABLET ORAL
Status: DISCONTINUED | OUTPATIENT
Start: 2022-12-22 | End: 2022-12-22

## 2022-12-22 RX ORDER — FENTANYL CITRATE 50 UG/ML
25 INJECTION, SOLUTION INTRAMUSCULAR; INTRAVENOUS EVERY 5 MIN PRN
Status: DISCONTINUED | OUTPATIENT
Start: 2022-12-22 | End: 2022-12-22

## 2022-12-22 RX ORDER — ONDANSETRON 2 MG/ML
INJECTION INTRAMUSCULAR; INTRAVENOUS
Status: DISCONTINUED | OUTPATIENT
Start: 2022-12-22 | End: 2022-12-22

## 2022-12-22 RX ORDER — SODIUM CHLORIDE 9 MG/ML
INJECTION, SOLUTION INTRAVENOUS CONTINUOUS
Status: DISCONTINUED | OUTPATIENT
Start: 2022-12-22 | End: 2022-12-23

## 2022-12-22 RX ORDER — MIDAZOLAM HYDROCHLORIDE 1 MG/ML
INJECTION, SOLUTION INTRAMUSCULAR; INTRAVENOUS
Status: DISCONTINUED | OUTPATIENT
Start: 2022-12-22 | End: 2022-12-22

## 2022-12-22 RX ORDER — NEOSTIGMINE METHYLSULFATE 0.5 MG/ML
INJECTION, SOLUTION INTRAVENOUS
Status: DISCONTINUED | OUTPATIENT
Start: 2022-12-22 | End: 2022-12-22

## 2022-12-22 RX ORDER — PROPOFOL 10 MG/ML
VIAL (ML) INTRAVENOUS
Status: DISCONTINUED | OUTPATIENT
Start: 2022-12-22 | End: 2022-12-22

## 2022-12-22 RX ORDER — ROCURONIUM BROMIDE 10 MG/ML
INJECTION, SOLUTION INTRAVENOUS
Status: DISCONTINUED | OUTPATIENT
Start: 2022-12-22 | End: 2022-12-22

## 2022-12-22 RX ORDER — ESCITALOPRAM OXALATE 10 MG/1
10 TABLET ORAL DAILY
Status: DISCONTINUED | OUTPATIENT
Start: 2022-12-23 | End: 2022-12-23 | Stop reason: HOSPADM

## 2022-12-22 RX ORDER — FENTANYL CITRATE 50 UG/ML
INJECTION, SOLUTION INTRAMUSCULAR; INTRAVENOUS
Status: DISCONTINUED | OUTPATIENT
Start: 2022-12-22 | End: 2022-12-22

## 2022-12-22 RX ORDER — POLYETHYLENE GLYCOL 3350 17 G/17G
17 POWDER, FOR SOLUTION ORAL DAILY
Status: DISCONTINUED | OUTPATIENT
Start: 2022-12-23 | End: 2022-12-22

## 2022-12-22 RX ORDER — LIDOCAINE HYDROCHLORIDE 10 MG/ML
1 INJECTION, SOLUTION EPIDURAL; INFILTRATION; INTRACAUDAL; PERINEURAL ONCE AS NEEDED
Status: DISCONTINUED | OUTPATIENT
Start: 2022-12-22 | End: 2022-12-22

## 2022-12-22 RX ORDER — HYDROMORPHONE HYDROCHLORIDE 1 MG/ML
INJECTION, SOLUTION INTRAMUSCULAR; INTRAVENOUS; SUBCUTANEOUS
Status: DISPENSED
Start: 2022-12-22 | End: 2022-12-23

## 2022-12-22 RX ORDER — HYDROMORPHONE HYDROCHLORIDE 1 MG/ML
0.2 INJECTION, SOLUTION INTRAMUSCULAR; INTRAVENOUS; SUBCUTANEOUS EVERY 5 MIN PRN
Status: DISCONTINUED | OUTPATIENT
Start: 2022-12-22 | End: 2022-12-22

## 2022-12-22 RX ORDER — VANCOMYCIN HYDROCHLORIDE 1 G/20ML
INJECTION, POWDER, LYOPHILIZED, FOR SOLUTION INTRAVENOUS
Status: DISCONTINUED | OUTPATIENT
Start: 2022-12-22 | End: 2022-12-22 | Stop reason: HOSPADM

## 2022-12-22 RX ORDER — SENNOSIDES 8.6 MG/1
8.6 TABLET ORAL DAILY PRN
Status: DISCONTINUED | OUTPATIENT
Start: 2022-12-23 | End: 2022-12-23 | Stop reason: HOSPADM

## 2022-12-22 RX ORDER — LAMOTRIGINE 100 MG/1
100 TABLET ORAL DAILY
Status: DISCONTINUED | OUTPATIENT
Start: 2022-12-23 | End: 2022-12-23 | Stop reason: HOSPADM

## 2022-12-22 RX ORDER — DEXAMETHASONE SODIUM PHOSPHATE 4 MG/ML
INJECTION, SOLUTION INTRA-ARTICULAR; INTRALESIONAL; INTRAMUSCULAR; INTRAVENOUS; SOFT TISSUE
Status: DISCONTINUED | OUTPATIENT
Start: 2022-12-22 | End: 2022-12-22

## 2022-12-22 RX ORDER — BACITRACIN ZINC 500 UNIT/G
OINTMENT (GRAM) TOPICAL
Status: DISCONTINUED | OUTPATIENT
Start: 2022-12-22 | End: 2022-12-22 | Stop reason: HOSPADM

## 2022-12-22 RX ORDER — ONDANSETRON 2 MG/ML
4 INJECTION INTRAMUSCULAR; INTRAVENOUS DAILY PRN
Status: DISCONTINUED | OUTPATIENT
Start: 2022-12-22 | End: 2022-12-22 | Stop reason: HOSPADM

## 2022-12-22 RX ORDER — MORPHINE SULFATE 2 MG/ML
1 INJECTION, SOLUTION INTRAMUSCULAR; INTRAVENOUS EVERY 4 HOURS PRN
Status: DISCONTINUED | OUTPATIENT
Start: 2022-12-22 | End: 2022-12-23 | Stop reason: HOSPADM

## 2022-12-22 RX ADMIN — SODIUM CHLORIDE: 9 INJECTION, SOLUTION INTRAVENOUS at 07:12

## 2022-12-22 RX ADMIN — PROPOFOL 200 MG: 10 INJECTION, EMULSION INTRAVENOUS at 02:12

## 2022-12-22 RX ADMIN — HYDROMORPHONE HYDROCHLORIDE 0.2 MG: 1 INJECTION, SOLUTION INTRAMUSCULAR; INTRAVENOUS; SUBCUTANEOUS at 04:12

## 2022-12-22 RX ADMIN — MIDAZOLAM HYDROCHLORIDE 2 MG: 1 INJECTION, SOLUTION INTRAMUSCULAR; INTRAVENOUS at 02:12

## 2022-12-22 RX ADMIN — DEXMEDETOMIDINE HYDROCHLORIDE 8 MCG: 100 INJECTION, SOLUTION INTRAVENOUS at 04:12

## 2022-12-22 RX ADMIN — ROCURONIUM BROMIDE 40 MG: 10 INJECTION INTRAVENOUS at 02:12

## 2022-12-22 RX ADMIN — NEOSTIGMINE METHYLSULFATE 5 MG: 0.5 INJECTION, SOLUTION INTRAVENOUS at 03:12

## 2022-12-22 RX ADMIN — HYDROMORPHONE HYDROCHLORIDE 0.2 MG: 1 INJECTION, SOLUTION INTRAMUSCULAR; INTRAVENOUS; SUBCUTANEOUS at 05:12

## 2022-12-22 RX ADMIN — OXYCODONE 5 MG: 5 TABLET ORAL at 04:12

## 2022-12-22 RX ADMIN — PHENYLEPHRINE HYDROCHLORIDE 100 MCG: 10 INJECTION INTRAVENOUS at 03:12

## 2022-12-22 RX ADMIN — DEXAMETHASONE SODIUM PHOSPHATE 4 MG: 4 INJECTION INTRA-ARTICULAR; INTRALESIONAL; INTRAMUSCULAR; INTRAVENOUS; SOFT TISSUE at 02:12

## 2022-12-22 RX ADMIN — SODIUM CHLORIDE: 9 INJECTION, SOLUTION INTRAVENOUS at 03:12

## 2022-12-22 RX ADMIN — ONDANSETRON 4 MG: 2 INJECTION INTRAMUSCULAR; INTRAVENOUS at 03:12

## 2022-12-22 RX ADMIN — PROPOFOL 50 MG: 10 INJECTION, EMULSION INTRAVENOUS at 02:12

## 2022-12-22 RX ADMIN — LIDOCAINE HYDROCHLORIDE 100 MG: 20 INJECTION, SOLUTION EPIDURAL; INFILTRATION; INTRACAUDAL; PERINEURAL at 02:12

## 2022-12-22 RX ADMIN — MORPHINE SULFATE 1 MG: 2 INJECTION, SOLUTION INTRAMUSCULAR; INTRAVENOUS at 09:12

## 2022-12-22 RX ADMIN — GLYCOPYRROLATE 0.6 MG: 0.2 INJECTION INTRAMUSCULAR; INTRAVENOUS at 03:12

## 2022-12-22 RX ADMIN — ROCURONIUM BROMIDE 10 MG: 10 INJECTION INTRAVENOUS at 03:12

## 2022-12-22 RX ADMIN — FENTANYL CITRATE 100 MCG: 50 INJECTION INTRAMUSCULAR; INTRAVENOUS at 02:12

## 2022-12-22 RX ADMIN — DEXMEDETOMIDINE HYDROCHLORIDE 4 MCG: 100 INJECTION, SOLUTION INTRAVENOUS at 04:12

## 2022-12-22 RX ADMIN — PROPOFOL 100 MG: 10 INJECTION, EMULSION INTRAVENOUS at 03:12

## 2022-12-22 RX ADMIN — ACETAMINOPHEN 650 MG: 325 TABLET ORAL at 07:12

## 2022-12-22 RX ADMIN — SODIUM CHLORIDE: 9 INJECTION, SOLUTION INTRAVENOUS at 02:12

## 2022-12-22 RX ADMIN — CEFTRIAXONE 2 G: 1 INJECTION, POWDER, FOR SOLUTION INTRAMUSCULAR; INTRAVENOUS at 03:12

## 2022-12-22 NOTE — ANESTHESIA PROCEDURE NOTES
Intubation    Date/Time: 12/22/2022 2:50 PM  Performed by: Benjie Domingo MD  Authorized by: Akira Beck MD     Intubation:     Induction:  Intravenous    Intubated:  Postinduction    Mask Ventilation:  Very difficult with oral airway    Attempts:  2    Attempted By:  Resident anesthesiologist    Method of Intubation:  Direct    Blade:  Jose 2    Laryngeal View Grade: Grade IIb - only the arytenoids and epiglottis seen      Attempted By (2nd Attempt):  Staff anesthesiologist    Method of Intubation (2nd Attempt):  Direct    Blade (2nd Attempt):  Jose 2    Laryngeal View Grade (2nd Attempt): Grade I - full view of cords      Difficult Airway Encountered?: No      Complications:  None    Airway Device:  Oral endotracheal tube    Airway Device Size:  7.5    Style/Cuff Inflation:  Cuffed (inflated to minimal occlusive pressure)    Tube secured:  24    Secured at:  The lips    Placement Verified By:  Capnometry    Complicating Factors:  None    Findings Post-Intubation:  BS equal bilateral and atraumatic/condition of teeth unchanged

## 2022-12-22 NOTE — TRANSFER OF CARE
Anesthesia Transfer of Care Note    Patient: Gurwinder Alistair    Procedure(s) Performed: Procedure(s) (LRB):  CRANIOTOMY for right frontal osteoma (Right)    Patient location: PACU    Anesthesia Type: general    Transport from OR: Transported from OR on 6-10 L/min O2 by face mask with adequate spontaneous ventilation    Post pain: adequate analgesia    Post assessment: no apparent anesthetic complications and tolerated procedure well    Post vital signs: stable    Level of consciousness: awake and alert    Nausea/Vomiting: no nausea/vomiting    Complications: none    Transfer of care protocol was followed      Last vitals:   Visit Vitals  /75 (BP Location: Left arm, Patient Position: Lying)   Pulse 67   Temp 36.9 °C (98.4 °F) (Oral)   Resp 18   SpO2 95%

## 2022-12-22 NOTE — H&P
"Please below see clinic note from Dr. Galdamez clinic on 11/1/11. Agree with assessment and plan. Proceed to OR.     Theresa Mason  NSGY PGY2                        Subjective:   I, Lynn Hood, attest that this documentation has been prepared under the direction and in the presence of Adal Galdamez MD.      Patient ID: Gurwinder Sainz is a 32 y.o. male      Chief Complaint: No chief complaint on file.        HPI  Mr. Gurwinder aSinz is a 32 y.o. gentleman referred to me by Essie Rowe, who presents today to establish care. He was referred to NSGY by his PCP for right frontal calvarium lesion. Patient initially noticed the prominence in 2008. He had a CT head in 2017 which showed "small sessile appearing osteoma arising from the right frontal calvarium accounting for patient's complaint of a right scalp mass." The CT scan also demonstrated a 1.9 cm dural based calcification along the falx as well as hyperostosis of the right parietal-occipital calvarium. Patient states it has remained unchanged in size. However, he states it has become more prominent and visible with his receding hairline. Otherwise, pt presents asymptomatic and states this is non bothersome.     Review of Systems   Constitutional:  Negative for activity change, appetite change, fatigue, fever and unexpected weight change.   HENT:  Negative for facial swelling.    Eyes: Negative.    Respiratory: Negative.     Cardiovascular: Negative.    Gastrointestinal:  Negative for diarrhea, nausea and vomiting.   Endocrine: Negative.    Genitourinary: Negative.    Musculoskeletal:  Negative for back pain, joint swelling, myalgias and neck pain.   Neurological:  Negative for dizziness, seizures, weakness, numbness and headaches.   Psychiatric/Behavioral: Negative.         History reviewed. No pertinent past medical history.     Objective:          Vitals:     11/01/22 1020   BP: 114/82   Pulse: 72      Physical Exam  Constitutional:       General: He is not in " acute distress.     Appearance: Normal appearance.   HENT:      Head: Normocephalic and atraumatic.   Pulmonary:      Effort: Pulmonary effort is normal.   Musculoskeletal:      Cervical back: Neck supple.   Neurological:      Mental Status: He is alert and oriented to person, place, and time.      GCS: GCS eye subscore is 4. GCS verbal subscore is 5. GCS motor subscore is 6.      Cranial Nerves: No cranial nerve deficit.      IMAGING:  CT Head W Contrast (10/21/2022):  Prominent ossification from the frontal calvarium represents the palpable abnormality.  This is similar dating back to 2017. Extensive ossification of the dura is identified with some mass effect in the posterior fossa as discussed above similar to the prior study of 2017.     I have personally reviewed the images with the pt.       I, Dr. Adal Galdamez, personally performed the services described in this documentation. All medical record entries made by the scribe, Lynn Hood, were at my direction and in my presence.  I have reviewed the chart and agree that the record reflects my personal performance and is accurate and complete. Adal Galdamez MD. 11/01/2022     Assessment:       Osteoma.        Plan:   I have personally reviewed the CTH with the pt which shows prominent ossification from the frontal calvarium represents the palpable abnormality.  This is similar dating back to 2017. Extensive ossification of the dura is identified with some mass effect in the posterior fossa as discussed above similar to the prior study of 2017.     I recommend right frontal craniotomy for osteoma. I have discussed the risks/benefits, indications, and alternatives for the proposed procedure in detail. I have answered all of their questions and patient wish to proceed with surgery. We will schedule patient.

## 2022-12-22 NOTE — OP NOTE
"DATE OF PROCEDURE:  12/22/2022     SURGEON:  Adal Galdamez M.D., Ph.D.     ASSISTANT FOR THIS SURGERY:  Lissette Jose M.D. (RES) (the assistant is a [facility] resident).     PREOPERATIVE DIAGNOSES: Right frontal osteoma.     POSTOPERATIVE DIAGNOSES: Right frontal osteoma.      PROCEDURES PERFORMED:  1.  Right frontal craniectomy for tumor.  2.  Stealth navigation.  3.  Cranioplasty (3 cm).      INDICATIONS IN DETAIL:    Mr. Gurwinder Sainz is a 32 y.o. gentleman who presents today for surgery. He was referred to NSGY by his PCP for right frontal calvarium lesion. Patient initially noticed the prominence in 2008. He had a CT head in 2017 which showed "small sessile appearing osteoma arising from the right frontal calvarium accounting for patient's complaint of a right scalp mass." The CT scan also demonstrated a 1.9 cm dural based calcification along the falx as well as hyperostosis of the right parietal-occipital calvarium. Patient states it has remained unchanged in size. However, he states it has become more prominent and visible with his receding hairline. Otherwise, pt presents asymptomatic and states this is non bothersome.  CT Head W Contrast (10/21/2022): Prominent ossification from the frontal calvarium represents the palpable abnormality.  This is similar dating back to 2017. Extensive ossification of the dura is identified with some mass effect in the posterior fossa as discussed above similar to the prior study of 2017.  I recommend right frontal craniectomy for osteoma. I have discussed the risks/benefits, indications, and alternatives for the proposed procedure in detail. I have answered all of their questions and patient wish to proceed with surgery.      PROCEDURE IN DETAIL:    The patient was seen in the pretreatment area and the risks, benefits and alternatives were again discussed and the patient wished to proceed.  The patient was brought to the Operating Room and a general " anesthetic was administered.  All proper lines were placed.  The patient was placed in the supine position and his head was turned towards the left to allow access to the right frontal region.  The head was placed in 3-point fixation using Malone headholder.  The Stealth navigation system was brought to the field and an accurate registration was achieved using the tracer function.  The patient had a CT prior to surgery that was used.  The patient's hairline was traced.  The area of osteoma was also traced over the skin.  We planned a curved incision behind the hairline to access this.  The hair was clipped using electric clippers.  The patient's head was cleaned, prepped, and draped in usual fashion.  A lidocaine/Marcaine mix was infiltrated under the skin.  An incision was made with a 10 blade and carried through the galea using Bovie cautery.  We dissected forward until we had dissected the skin over the osteoma.  The soft tissues were cut using Bovie cautery.  A single bur hole was made posterior to the osteoma.  Then using a [email or web address] drill with a craniotome attachment, we turned a craniotomy flap around the osteoma.  It was removed on block.  There was a dural rent.  The wound was irrigated copiously and all bleeding points were coagulated.  A dry piece of Gelfoam was placed over the dura.  A titanium plate was cut to fit in this area.  This was attached using Morse screws.  Once this was secured, the soft tissues were closed in layers.  The skin edges were reapproximated using staples.  A clean dressing was placed.  The patient was placed on a gurney.    The patient was awakened by the Anesthesia staff.  At the point the patient was awake and following commands, he was extubated.     Specimen included tumor taken for permanent evaluation.     EBL was approximately 50 mL.     There were no intraprocedural complications.     All counts were correct at the end of surgery.     Dr. Adal Galdamez was present during  the entire procedure.

## 2022-12-22 NOTE — PLAN OF CARE
Chart reviewed. Pre-op nursing care per orders. Safe surgery checklist complete. Patient has spoken to Dr. Galdamez at bedside. Friend at bedside. Awaiting anesthesia consent, side rails up x's 2, bed in low position, call bell within reach. Patient belongings given to friend.

## 2022-12-23 VITALS
SYSTOLIC BLOOD PRESSURE: 113 MMHG | HEART RATE: 69 BPM | OXYGEN SATURATION: 93 % | TEMPERATURE: 99 F | RESPIRATION RATE: 17 BRPM | DIASTOLIC BLOOD PRESSURE: 57 MMHG

## 2022-12-23 PROBLEM — M89.9 SKULL LESION: Status: ACTIVE | Noted: 2022-12-23

## 2022-12-23 LAB
BASOPHILS # BLD AUTO: 0.05 K/UL (ref 0–0.2)
BASOPHILS NFR BLD: 0.3 % (ref 0–1.9)
DIFFERENTIAL METHOD: ABNORMAL
EOSINOPHIL # BLD AUTO: 0 K/UL (ref 0–0.5)
EOSINOPHIL NFR BLD: 0.1 % (ref 0–8)
ERYTHROCYTE [DISTWIDTH] IN BLOOD BY AUTOMATED COUNT: 12.9 % (ref 11.5–14.5)
HCT VFR BLD AUTO: 44 % (ref 40–54)
HGB BLD-MCNC: 14.1 G/DL (ref 14–18)
IMM GRANULOCYTES # BLD AUTO: 0.1 K/UL (ref 0–0.04)
IMM GRANULOCYTES NFR BLD AUTO: 0.6 % (ref 0–0.5)
LYMPHOCYTES # BLD AUTO: 2.3 K/UL (ref 1–4.8)
LYMPHOCYTES NFR BLD: 12.9 % (ref 18–48)
MCH RBC QN AUTO: 29.9 PG (ref 27–31)
MCHC RBC AUTO-ENTMCNC: 32 G/DL (ref 32–36)
MCV RBC AUTO: 93 FL (ref 82–98)
MONOCYTES # BLD AUTO: 1.3 K/UL (ref 0.3–1)
MONOCYTES NFR BLD: 7.5 % (ref 4–15)
NEUTROPHILS # BLD AUTO: 14.1 K/UL (ref 1.8–7.7)
NEUTROPHILS NFR BLD: 78.6 % (ref 38–73)
NRBC BLD-RTO: 0 /100 WBC
PLATELET # BLD AUTO: 339 K/UL (ref 150–450)
PMV BLD AUTO: 9.9 FL (ref 9.2–12.9)
RBC # BLD AUTO: 4.72 M/UL (ref 4.6–6.2)
WBC # BLD AUTO: 17.92 K/UL (ref 3.9–12.7)

## 2022-12-23 PROCEDURE — 97165 OT EVAL LOW COMPLEX 30 MIN: CPT

## 2022-12-23 PROCEDURE — 85025 COMPLETE CBC W/AUTO DIFF WBC: CPT | Performed by: PHYSICIAN ASSISTANT

## 2022-12-23 PROCEDURE — 99024 POSTOP FOLLOW-UP VISIT: CPT | Mod: ,,, | Performed by: PHYSICIAN ASSISTANT

## 2022-12-23 PROCEDURE — 63600175 PHARM REV CODE 636 W HCPCS: Performed by: STUDENT IN AN ORGANIZED HEALTH CARE EDUCATION/TRAINING PROGRAM

## 2022-12-23 PROCEDURE — 36415 COLL VENOUS BLD VENIPUNCTURE: CPT | Performed by: PHYSICIAN ASSISTANT

## 2022-12-23 PROCEDURE — 25000003 PHARM REV CODE 250: Performed by: STUDENT IN AN ORGANIZED HEALTH CARE EDUCATION/TRAINING PROGRAM

## 2022-12-23 PROCEDURE — 99024 PR POST-OP FOLLOW-UP VISIT: ICD-10-PCS | Mod: ,,, | Performed by: PHYSICIAN ASSISTANT

## 2022-12-23 PROCEDURE — 97161 PT EVAL LOW COMPLEX 20 MIN: CPT

## 2022-12-23 RX ORDER — HYDROCODONE BITARTRATE AND ACETAMINOPHEN 5; 325 MG/1; MG/1
1 TABLET ORAL EVERY 4 HOURS PRN
Status: DISCONTINUED | OUTPATIENT
Start: 2022-12-23 | End: 2022-12-23 | Stop reason: HOSPADM

## 2022-12-23 RX ORDER — HYDROCODONE BITARTRATE AND ACETAMINOPHEN 5; 325 MG/1; MG/1
1 TABLET ORAL EVERY 4 HOURS PRN
Status: DISCONTINUED | OUTPATIENT
Start: 2022-12-23 | End: 2022-12-23

## 2022-12-23 RX ORDER — HYDROCODONE BITARTRATE AND ACETAMINOPHEN 5; 325 MG/1; MG/1
1 TABLET ORAL EVERY 6 HOURS PRN
Qty: 21 TABLET | Refills: 0 | Status: SHIPPED | OUTPATIENT
Start: 2022-12-23 | End: 2022-12-27

## 2022-12-23 RX ORDER — HYDROCODONE BITARTRATE AND ACETAMINOPHEN 5; 325 MG/1; MG/1
1 TABLET ORAL ONCE
Status: DISCONTINUED | OUTPATIENT
Start: 2022-12-23 | End: 2022-12-23 | Stop reason: HOSPADM

## 2022-12-23 RX ADMIN — ESCITALOPRAM OXALATE 10 MG: 10 TABLET ORAL at 08:12

## 2022-12-23 RX ADMIN — ACETAMINOPHEN 650 MG: 325 TABLET ORAL at 01:12

## 2022-12-23 RX ADMIN — LAMOTRIGINE 100 MG: 100 TABLET ORAL at 08:12

## 2022-12-23 RX ADMIN — MORPHINE SULFATE 1 MG: 2 INJECTION, SOLUTION INTRAMUSCULAR; INTRAVENOUS at 06:12

## 2022-12-23 RX ADMIN — ACETAMINOPHEN 650 MG: 325 TABLET ORAL at 08:12

## 2022-12-23 RX ADMIN — MORPHINE SULFATE 1 MG: 2 INJECTION, SOLUTION INTRAMUSCULAR; INTRAVENOUS at 10:12

## 2022-12-23 RX ADMIN — MORPHINE SULFATE 1 MG: 2 INJECTION, SOLUTION INTRAMUSCULAR; INTRAVENOUS at 01:12

## 2022-12-23 NOTE — PT/OT/SLP EVAL
"Occupational Therapy   Co-Evaluation and Discharge Note    Name: Gurwinder Sainz  MRN: [id number]  Admitting Diagnosis: <principal problem not specified>  Recent Surgery: Procedure(s) (LRB):  CRANIOTOMY for right frontal osteoma (Right) 1 Day Post-Op    Co-evaluation performed due to acuity and complexity of pt's medical status with 2 skilled disciplines needed to optimize pts occupational performance.      Recommendations:     Discharge Recommendations: other (see comments) (Home)  Discharge Equipment Recommendations: none  Barriers to discharge:  None    Assessment:     Gurwinder Sainz is a 32 y.o. male with a medical diagnosis of <principal problem not specified>.  Pt  presents agreeable to therapy and tolerated evaluation well this date. Upon therapy arrival, Pt was showered and fully dressed in personal clothing. Pt completed  bed mobility, sit<>stand, toilet t/fs with [place].  At this time, patient is functioning at their prior level of function and does not require further acute OT services.     Plan:     During this hospitalization, patient does not require further acute OT services.  Please re-consult if situation changes.    Plan of Care Reviewed with: patient    Subjective     Chief Complaint: "Only this is My head hurts" referring to incision site  Patient/Family Comments/goals: Return home    Occupational Profile:  Living Environment: Pt lives with wife and son in a [facility] with 2 DANNI and BHR.  Pt has a T/S combo bathroom.   Previous level of function: Pt reports I in all ADLs/ IADLs with ability to drive prior to hospitalization.   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   Equipment Used at home: none  Assistance upon Discharge: Pt's wife will be able to provide assistance as needed.     Pain/Comfort:  Pain Rating 1: 6/10  Location 1: head  Pain Addressed 1: Distraction, Reposition    Patients cultural, spiritual, Nondenominational conflicts given the current situation: " no    Objective:     Communicated with: Nurse prior to session.  Patient found HOB elevated with  (No active lines) upon OT entry to room.    General Precautions: Standard, fall  Orthopedic Precautions: N/A  Braces: N/A  Respiratory Status: Room air     Occupational Performance:    Bed Mobility:    Patient completed Rolling/Turning to Right with independence  Patient completed Scooting/Bridging with independence  Patient completed Supine to Sit with independence  Patient completed Sit to Supine with independence    Functional Mobility/Transfers:  Patient completed Sit <> Stand Transfer with independence  with  no assistive device   Patient completed Toilet Transfer Step Transfer technique with independence with  no AD  Functional Mobility: Pt walked ~ 30' I with no noted LOB.     Activities of Daily Living:  Feeding:  independence while standing at EOB.   Pt reports showering and toileting completed prior to therapy arrival.  Pt fully dressed in his own clothes upon therapy arrival.     Cognitive/Visual Perceptual:  Cognitive/Psychosocial Skills:     -       Oriented to: Person, Place, Time, and Situation   -       Follows Commands/attention:Follows multistep  commands  -       Communication: clear/fluent  -       Safety awareness/insight to disability: intact   -       Mood/Affect/Coping skills/emotional control: Excited, Happy, and Pleasant  Visual/Perceptual:      -Intact      Physical Exam:  Sensation:    -       Intact  Dominant hand: -       Left  Upper Extremity Range of Motion:  -       Right Upper Extremity: WNL  -       Left Upper Extremity: WNL  Upper Extremity Strength:    -       Right Upper Extremity: WNL  -       Left Upper Extremity: WNL   Strength: -       Right Upper Extremity: WNL  -       Left Upper Extremity: WNL  Fine Motor Coordination:    -       Intact  Left hand, finger to nose, Right hand, finger to nose, Left hand thumb/finger opposition skills, Right hand thumb/finger opposition  skills, Left hand, diadochokinesis skill , and Right hand, diadochokinesis skill     AMPAC 6 Click ADL:  AMPAC Total Score: 24    Treatment & Education:  Educated on Role of OT and OT d/c planning   Pt educated on slower pace to ensure safety during functional mobility.    Patient left HOB elevated with call button in reach    GOALS:   Multidisciplinary Problems       Occupational Therapy Goals       Not on file                    History:     Past Medical History:   Diagnosis Date    Asthma     Bipolar disorder          Past Surgical History:   Procedure Laterality Date    COLONOSCOPY N/A 4/16/2018    Procedure: COLONOSCOPY;  Surgeon: DIEGO Walker MD;  Location: [facility] ENDO (4TH FLR);  Service: Endoscopy;  Laterality: N/A;    CRANIOTOMY Right 12/22/2022    Procedure: CRANIOTOMY for right frontal osteoma;  Surgeon: Adal Galdamez MD;  Location: [facility] OR [address] [place] [address];  Service: Neurosurgery;  Laterality: Right;  ASA1  TOR1  T&Screen    PENILE ADHESIONS LYSIS N/A 10/15/2019    Procedure: LYSIS, ADHESIONS, PENIS;  Surgeon: Bjorn Rudd MD;  Location: [facility] OR [address] [place] [address];  Service: Urology;  Laterality: N/A;    testicular torsion procedure      TONSILLECTOMY      upper wisdom teeth      VARICOCELECTOMY Left 10/15/2019    Procedure: EXCISION, VARICOCELE;  Surgeon: Bjorn Rudd MD;  Location: [address];  Service: Urology;  Laterality: Left;  1.5hr  operating microscope       Time Tracking:     OT Date of Treatment: 12/23/22  OT Start Time: 0951  OT Stop Time: 1000  OT Total Time (min): 9 min    Billable Minutes:Evaluation 9    12/23/2022

## 2022-12-23 NOTE — PLAN OF CARE
Problem: Adult Inpatient Plan of Care  Goal: Plan of Care Review  Outcome: Ongoing, Progressing  Goal: Patient-Specific Goal (Individualized)  Outcome: Ongoing, Progressing  Goal: Absence of Hospital-Acquired Illness or Injury  Outcome: Ongoing, Progressing  Goal: Optimal Comfort and Wellbeing  Outcome: Ongoing, Progressing  Goal: Readiness for Transition of Care  Outcome: Ongoing, Progressing     Problem: Bariatric Environmental Safety  Goal: Safety Maintained with Care  Outcome: Ongoing, Progressing    POC reviewed with the patient and they verbalized understanding. All comments and concerns addressed. Bed locked in lowest position with bed alarm set, call light within reach. Safety precautions maintained. VSS, see flowsheets. No events this shift - Pain mgt per PRN med schedule. Will continue to monitor for changes to POC and clinical condition.

## 2022-12-23 NOTE — PT/OT/SLP EVAL
"Physical Therapy Evaluation/co eval with OT/D/C Sil    Patient Name:  Gurwinder Sainz   MRN:  [id number]    Recommendations:     Discharge Recommendations: home   Discharge Equipment Recommendations: none   Barriers to discharge: None    Assessment:     Gurwinder Sainz is a 32 y.o. male admitted with a medical diagnosis of Skull lesion.  He presents with the following impairments/functional limitations: pain . PT D/Kishore due to pt able to perform functional mobility. PT educated in slowing pace for safety, he expressed understanding.    Recent Surgery: Procedure(s) (LRB):  CRANIOTOMY for right frontal osteoma (Right) 1 Day Post-Op    Plan:     (PT D/Kishore due to pt able to perform functional mobility including up/down steps) Plan of Care Expires:  01/22/23    Subjective   "I can move around"    Pain/Comfort:  Pain Rating 1: 6/10  Location - Side 1: Right  Location - Orientation 1: anterior  Location 1: head  Pain Addressed 1: Reposition, Distraction  Pain Rating Post-Intervention 1: 6/10    Patients cultural, spiritual, Mandaen conflicts given the current situation: no    Living Environment:  Pt lives with wife and dependent child in a 1 story home with 2 DANNI with rails  Prior to admission, patients level of function was independent.  Equipment used at home: none.  Upon discharge, patient will have assistance from wife.    Objective:     Communicated with nurse prior to session.  Patient found HOB elevated with  (none)  upon PT entry to room.    General Precautions: Standard, fall  Orthopedic Precautions:N/A   Braces: N/A  Respiratory Status: Room air    Exams:  Cognitive Exam:  Patient is oriented to Person, Place, and Time  Sensation:    -       Intact  light/touch B LE  RLE ROM: WFL  RLE Strength: WFL  LLE ROM: WFL  LLE Strength: WFL    Functional Mobility:  Bed Mobility:     Supine to Sit: independence  Sit to Supine: independence  Transfers:     Sit to Stand:  independence with no AD  Gait: 10ft then 60ft with " no AD with independence; pt performed standing balance activities: high knee gait, single leg stance, and ambulated backwards with no instability noted.   Stairs:  Pt ascended/descended 3 stair(s) with No Assistive Device with right handrail with Modified Independent.     AM-PAC 6 CLICK MOBILITY  Total Score:24     Treatment & Education:  Pt ambulated to the bathroom and was able to get on/off the toilet without assist.   Co-treat with OT due to pt needed to be seen by OT and PT and was awaiting D/C.  Patient left HOB elevated with call button in reach, bed alarm on, and nurse notified.    GOALS:   Multidisciplinary Problems       Physical Therapy Goals       Not on file              Multidisciplinary Problems (Resolved)          Problem: Physical Therapy    Goal Priority Disciplines Outcome Goal Variances Interventions   Physical Therapy Goal   (Resolved)     PT, PT/OT Met                         History:     Past Medical History:   Diagnosis Date    Asthma     Bipolar disorder        Past Surgical History:   Procedure Laterality Date    COLONOSCOPY N/A 4/16/2018    Procedure: COLONOSCOPY;  Surgeon: DIEGO Walker MD;  Location: [facility] (4TH FLR);  Service: Endoscopy;  Laterality: N/A;    CRANIOTOMY Right 12/22/2022    Procedure: CRANIOTOMY for right frontal osteoma;  Surgeon: Adal Galdamez MD;  Location: [address];  Service: Neurosurgery;  Laterality: Right;  ASA1  TOR1  T&Screen    PENILE ADHESIONS LYSIS N/A 10/15/2019    Procedure: LYSIS, ADHESIONS, PENIS;  Surgeon: Bjorn Rudd MD;  Location: [address];  Service: Urology;  Laterality: N/A;    testicular torsion procedure      TONSILLECTOMY      upper wisdom teeth      VARICOCELECTOMY Left 10/15/2019    Procedure: EXCISION, VARICOCELE;  Surgeon: Bjorn Rudd MD;  Location: [address];  Service: Urology;  Laterality: Left;  1.5hr  operating microscope       Time Tracking:     PT Received On: 12/23/22  PT Start Time: 0950     PT Stop  Time: 1001  PT Total Time (min): 11 min     Billable Minutes: Evaluation 11      12/23/2022

## 2022-12-23 NOTE — NURSING
AVS reviewed w/ patient. Patient verbalized understanding of education. All comments and concerns addressed. PIV & telemetry removed. VSS at discharge. All belongings sent with pt via private vehicle. Education provided to patient for wheelchair transport to vehicle, but patient declined.

## 2022-12-23 NOTE — PLAN OF CARE
[facility] (H[facility])  Discharge Final Note    Primary Care Provider: Vicky Bravo MD    Expected Discharge Date: 12/23/2022    Patient to be discharged home.  The patient does not have any home needs.  Family to provide transportation home.  Neurosurgery clinic to schedule follow up appointment.    Future Appointments   Date Time Provider Department Center   1/10/2023 10:45 AM Adal Galdamez MD [facility] NEUROS [facility]        Final Discharge Note (most recent)       Final Note - 12/23/22 1147          Final Note    Assessment Type Final Discharge Note     Anticipated Discharge Disposition Home or Self Care        Post-Acute Status    Post-Acute Authorization Other     Other Status No Post-Acute Service Needs     Discharge Delays None known at this time                     Important Message from Medicare

## 2022-12-23 NOTE — BRIEF OP NOTE
[facility] ([facility])  Brief Operative Note    SUMMARY     Surgery Date: 12/22/2022     Surgeon(s) and Role:     * Adal Covarrubias MD - Primary     * Lissette Jose MD - Resident, assisting    Pre-op Diagnosis:  Skull lesion [M89.9]    Post-op Diagnosis:  Post-Op Diagnosis Codes:     * Skull lesion [M89.9]    Procedure(s) (LRB):  CRANIOTOMY for right frontal osteoma (Right)    Anesthesia: General    Operative Findings: Right frontal osteoma resected, wire mesh placed. No intraoperative complications.     Estimated Blood Loss: 50 cc         Specimens:   Specimen (24h ago, onward)       Start     Ordered    12/22/22 1529  Specimen to Pathology, Surgery Neurosurgery  Once        Comments: Pre-op Diagnosis: Skull lesion [M89.9]Procedure(s):CRANIOTOMY for right frontal osteoma Number of specimens: 1Name of specimens: 1- Bone Tumor- Permanent     References:    Click here for ordering Quick Tip   Question Answer Comment   Procedure Type: Neurosurgery    Which provider would you like to cc? ADAL COVARRUBIAS    Release to patient Immediate        12/22/22 1528                    [id number]

## 2022-12-23 NOTE — DISCHARGE INSTRUCTIONS
Neurosurgery Patient Information  -Return to work will be determined on an individual basis.  -No driving while taking narcotic pain medication.   -Do not take any OTC products containing acetaminophen at the same time as you take your narcotic pain medication. Medications that may contain acetaminophen include but are not limited to: Excedrin and other headache medications, arthritis medications, cold and sinus medications, etc. Please review the list of active ingredients in any OTC medication prior to taking it.  -Do not take any Aspirin or Aspirin-containing products for 2 weeks after surgery.  -Do not take any Aleve, Naprosyn, Naproxen, Ibuprofen, Advil or any other nonsteroidal anti-inflammatory drug (NSAID) for 2 weeks after surgery.  -Do not take any herbal supplements for 2 weeks after surgery.   -Do not consume any alcoholic beverages until released by your neurosurgeon  -Do not perform any excessive bending over or leaning forward as this is a fall hazard.  -Do not perform any heavy lifting or lifting more than 5-10 lbs from the ground level as this is a fall hazard.  -Slowly increase your ambulation [walking] over the next 2 weeks as tolerated. The goal is to be walking 1-2 miles by the time of your 2 week post op appointment.   -Walk on paved surfaces only. It is okay to walk up and down stairs while holding onto a side rail.      Contact the Neurosurgery Office immediately if:  If you begin to notice any neurologic changes such as:           -Sudden onset of lethargy or sleepiness           -Sudden confusion, trouble speaking, or understanding            -Sudden trouble seeing in one or both eyes            -Sudden trouble walking, dizziness, loss of coordination            -Sudden severe headache with no known cause            -Sudden onset of numbness or weakness     Wound Care:  Keep your incision open to air. You may shower on the 2nd day after your surgery. Wash your hair with baby shampoo. Do not  rub or scrub the incision. Gently pat it dry. You cannot take a bath/swim/submerge the incision until 8 weeks after surgery. Do not wear hats or scarfs.     The incision does not need to be cleaned with any alcohol, peroxide, or other substance.    Call your doctor or go to the Emergency Room for any signs of infection including: increased redness, drainage, pain or fever (temperature greater than or equal to 101.4).       Miscellaneous:  -Follow up with Dr. Galdamez in 2 weeks for a wound check and pathology results.   Future Appointments   Date Time Provider Department Center   1/10/2023 10:45 AM Adal Galdamez MD [facility] NEUROS[facility] Neurosurgery Office: [phone]

## 2022-12-23 NOTE — HOSPITAL COURSE
12/23. NAEON. BP 90/54 overnight, CBC obtained. H&H stable. BP improved this AM. Leukocytosis noted without left shift.  Dex given intra-op. Patient reports incisional pain. Denies vision changes or seizures. Voiding spontaneously. Tolerating diet. Medically stable for discharge to home. Incision care and activity recommendations reviewed. Plan of care discussed with patient and he voiced understanding. His questions were all answered. Follow-up in Neurosurgery clinic arranged.

## 2022-12-23 NOTE — PLAN OF CARE
[facility] - Neurosurgery (Hospital)  Discharge Assessment    Primary Care Provider: Vicky Bravo MD     CM met with patient to obtain discharge planning assessment.        [id number] [place] - [address] [facility]  [address] [facility] [address]  Phone: [phone] Fax: [phone]    [id number] - [place] - [address]  [address]  [address]  [place] [address]  Phone: [phone] Fax: [phone]    [id number] - [place], [place] - [address] AT [facility] [address] [address]  Phone: [phone] Fax: [phone]    [facility] - [facility] [address]  [address]  [facility] [address]  Phone: [phone] Fax: [phone]       Discharge Assessment (most recent)       BRIEF DISCHARGE ASSESSMENT - 12/23/22 1041          Discharge Planning    Assessment Type Discharge Planning Brief Assessment     Resource/Environmental Concerns none     Support Systems Spouse/significant other     Equipment Currently Used at Home none     Current Living Arrangements home     Patient/Family Anticipates Transition to home with family     Patient/Family Anticipated Services at Transition none     DME Needed Upon Discharge  none     Discharge Plan A Home with family     Discharge Plan B Home with family;Home Health

## 2022-12-23 NOTE — PLAN OF CARE
Problem: Physical Therapy  Goal: Physical Therapy Goal  Outcome: Met   PT D/Kishore due to pt able to perform functional mobility including up/down steps. Tiana Vega PT 12/29/22

## 2022-12-23 NOTE — DISCHARGE SUMMARY
"[facility] - Neurosurgery ([facility])  Neurosurgery  Discharge Summary      Patient Name: Gurwinder Sainz  MRN: [id number]  Admission Date: 12/22/2022  Hospital Length of Stay: 1 days  Discharge Date and Time: 12/232022  Attending Physician: Adal Galdamez MD   Discharging Provider: Debra Ybarra PA-C  Primary Care Provider: Vicky Bravo MD    HPI:   Mr. Gurwinder Sainz is a 32 y.o. gentleman who presents today for surgery. He was referred to NSGY by his PCP for right frontal calvarium lesion. Patient initially noticed the prominence in 2008. He had a CT head in 2017 which showed "small sessile appearing osteoma arising from the right frontal calvarium accounting for patient's complaint of a right scalp mass." The CT scan also demonstrated a 1.9 cm dural based calcification along the falx as well as hyperostosis of the right parietal-occipital calvarium. Patient states it has remained unchanged in size. However, he states it has become more prominent and visible with his receding hairline. Otherwise, pt presents asymptomatic and states this is non bothersome.  CT Head W Contrast (10/21/2022): Prominent ossification from the frontal calvarium represents the palpable abnormality.  This is similar dating back to 2017. Extensive ossification of the dura is identified with some mass effect in the posterior fossa as discussed above similar to the prior study of 2017. Dr. Galdamez recommend right frontal craniectomy for osteoma.       Procedure(s) (LRB):  CRANIOTOMY for right frontal osteoma (Right)     Hospital Course: 12/23. NAEON. BP 90/54 overnight, CBC obtained. H&H stable. BP improved this AM. Leukocytosis noted without left shift.  Dex given intra-op. Patient reports incisional pain. Denies vision changes or seizures. Voiding spontaneously. Tolerating diet. Medically stable for discharge to home. Incision care and activity recommendations reviewed. Plan of care discussed with patient and he voiced understanding. His " questions were all answered. Follow-up in Neurosurgery clinic arranged.     [facility] with expected post op changes.     Goals of Care Treatment Preferences:         Consults: n/a    Significant Diagnostic Studies: Labs:   BMP: No results for input(s): GLU, NA, K, CL, CO2, BUN, CREATININE, CALCIUM, MG in the last 48 hours., CMP No results for input(s): NA, K, CL, CO2, GLU, BUN, CREATININE, CALCIUM, PROT, ALBUMIN, BILITOT, ALKPHOS, AST, ALT, ANIONGAP, ESTGFRAFRICA, EGFRNONAA in the last 48 hours. and CBC   Recent Labs   Lab 12/23/22  0910   WBC 17.92*   HGB 14.1   HCT 44.0        Radiology: [facility]    Neurosurgery Physical Exam:  General: well developed, well nourished, no distress.   Head: normocephalic, atraumatic  Neck: No tracheal deviation. No palpable masses. Full ROM.   Neurologic: Alert and oriented. Thought content appropriate.  GCS: E4 V5 M6. GCS Total: 15  Mental Status: Awake, Alert, Oriented x 4  Language: No aphasia  Speech: No dysarthria  Cranial nerves: face symmetric, tongue midline, CN II-XII grossly intact.   Eyes: EOMI.   Pulmonary: normal respirations, no signs of respiratory distress  Abdomen: soft, non-distended, not tender to palpation    Sensory: intact to light touch throughout  Motor Strength: Moves all extremities spontaneously with good tone.  Grossly Full strength upper and lower extremities. No abnormal movements seen.     Vascular: No LE edema.   Skin: Skin is warm, dry and intact.    Cerebellar:   Finger-to-nose: intact bilaterally   Pronator drift: absent bilaterally      Incision: c/d/i with skin edges well approximated with staples. No surrounding erythema or edema. No drainage from incision. No palpable hematoma or underlying fluid collection.      Pending Diagnostic Studies:     Procedure Component Value Units Date/Time    Specimen to Pathology, Surgery Neurosurgery [[id number]] Collected: 12/22/22 1529    Order Status: Sent Lab Status: In process Updated: 12/22/22 1530    Specimen:  Tissue         Final Active Diagnoses:    Diagnosis Date Noted POA    PRINCIPAL PROBLEM:  Skull lesion [M89.9] 12/23/2022 Yes      Problems Resolved During this Admission:      Discharged Condition: good     Disposition: Home or Self Care    Follow Up:  Future Appointments   Date Time Provider Department Center   1/10/2023 10:45 AM Adal Galdamez MD [facility] NEUROSC [facility]         Patient Instructions:      Notify your health care provider if you experience any of the following:  increased confusion or weakness     Notify your health care provider if you experience any of the following:  persistent dizziness, light-headedness, or visual disturbances     Notify your health care provider if you experience any of the following:  worsening rash     Notify your health care provider if you experience any of the following:  severe persistent headache     Notify your health care provider if you experience any of the following:  difficulty breathing or increased cough     Notify your health care provider if you experience any of the following:  redness, tenderness, or signs of infection (pain, swelling, redness, odor or green/yellow discharge around incision site)     Notify your health care provider if you experience any of the following:  severe uncontrolled pain     Notify your health care provider if you experience any of the following:  persistent nausea and vomiting or diarrhea     Notify your health care provider if you experience any of the following:  temperature >100.4     Activity as tolerated     Medications:  Reconciled Home Medications:      Medication List      START taking these medications    HYDROcodone-acetaminophen 5-325 mg per tablet  Commonly known as: NORCO  Take 1 tablet by mouth every 6 (six) hours as needed for Pain.        CONTINUE taking these medications    EScitalopram oxalate 10 MG tablet  Commonly known as: LEXAPRO  Take 10 mg by mouth once daily.     IMODIUM A-D ORAL  Take by mouth as needed.  diarrhea     lamoTRIgine 100 MG tablet  Commonly known as: LAMICTAL  Take 100 mg by mouth once daily.     MOUNJARO 2.5 mg/0.5 mL Pnij  Generic drug: tirzepatide  Inject 2.5 mg into the skin every 7 days. TAKES ON THURSDAY     MUCINEX ORAL  Take by mouth.            Debra Ybarra PA-C  Neurosurgery  [facility] - Neurosurgery ([facility])

## 2022-12-23 NOTE — HPI
"Mr. Gurwinder Sainz is a 32 y.o. gentleman who presents today for surgery. He was referred to NSGY by his PCP for right frontal calvarium lesion. Patient initially noticed the prominence in 2008. He had a CT head in 2017 which showed "small sessile appearing osteoma arising from the right frontal calvarium accounting for patient's complaint of a right scalp mass." The CT scan also demonstrated a 1.9 cm dural based calcification along the falx as well as hyperostosis of the right parietal-occipital calvarium. Patient states it has remained unchanged in size. However, he states it has become more prominent and visible with his receding hairline. Otherwise, pt presents asymptomatic and states this is non bothersome.  CT Head W Contrast (10/21/2022): Prominent ossification from the frontal calvarium represents the palpable abnormality.  This is similar dating back to 2017. Extensive ossification of the dura is identified with some mass effect in the posterior fossa as discussed above similar to the prior study of 2017. Dr. Galdamez recommend right frontal craniectomy for osteoma.   "

## 2022-12-23 NOTE — NURSING
Patient received in room via a stretcher,AAOx4 and able to move all his extremities.Bed is in low position,,bed rails up x2 and call light within reach.

## 2022-12-24 ENCOUNTER — PATIENT MESSAGE (OUTPATIENT)
Dept: NEUROSURGERY | Facility: CLINIC | Age: 32
End: 2022-12-24
Payer: COMMERCIAL

## 2022-12-24 LAB
BLD PROD TYP BPU: NORMAL
BLD PROD TYP BPU: NORMAL
BLOOD UNIT EXPIRATION DATE: NORMAL
BLOOD UNIT EXPIRATION DATE: NORMAL
BLOOD UNIT TYPE CODE: 5100
BLOOD UNIT TYPE CODE: 5100
BLOOD UNIT TYPE: NORMAL
BLOOD UNIT TYPE: NORMAL
CODING SYSTEM: NORMAL
CODING SYSTEM: NORMAL
DISPENSE STATUS: NORMAL
DISPENSE STATUS: NORMAL
NUM UNITS TRANS PACKED RBC: NORMAL
NUM UNITS TRANS PACKED RBC: NORMAL

## 2022-12-27 DIAGNOSIS — M89.9 SKULL LESION: ICD-10-CM

## 2022-12-27 DIAGNOSIS — M89.9 SKULL LESION: Primary | ICD-10-CM

## 2022-12-27 RX ORDER — HYDROCODONE BITARTRATE AND ACETAMINOPHEN 10; 325 MG/1; MG/1
1 TABLET ORAL EVERY 6 HOURS PRN
Qty: 40 TABLET | Refills: 0 | Status: SHIPPED | OUTPATIENT
Start: 2022-12-27 | End: 2022-12-27

## 2022-12-27 RX ORDER — HYDROCODONE BITARTRATE AND ACETAMINOPHEN 10; 325 MG/1; MG/1
1 TABLET ORAL EVERY 6 HOURS PRN
Qty: 40 TABLET | Refills: 0 | Status: SHIPPED | OUTPATIENT
Start: 2022-12-27

## 2022-12-27 NOTE — TELEPHONE ENCOUNTER
Pt still in a lot of discomfort radiating to his temple and the back of his head. Denies signs of infection. He is out of his Norco but per message over the weekend, they were not covering his pain level. He is appropriate and appears realistic..    Advised to use Bacitracin BID, may use indirect cold compresses x 20 mins. I will discuss alternative pain meds with PA.

## 2023-01-05 LAB
FINAL PATHOLOGIC DIAGNOSIS: NORMAL
GROSS: NORMAL
Lab: NORMAL
MICROSCOPIC EXAM: NORMAL

## 2023-01-10 ENCOUNTER — TUMOR BOARD CONFERENCE (OUTPATIENT)
Dept: NEUROSURGERY | Facility: CLINIC | Age: 33
End: 2023-01-10

## 2023-01-10 ENCOUNTER — OFFICE VISIT (OUTPATIENT)
Dept: NEUROSURGERY | Facility: CLINIC | Age: 33
End: 2023-01-10
Payer: COMMERCIAL

## 2023-01-10 VITALS
DIASTOLIC BLOOD PRESSURE: 80 MMHG | WEIGHT: 272.69 LBS | HEART RATE: 59 BPM | BODY MASS INDEX: 39.13 KG/M2 | SYSTOLIC BLOOD PRESSURE: 113 MMHG

## 2023-01-10 DIAGNOSIS — D16.4 OSTEOMA OF SKULL: ICD-10-CM

## 2023-01-10 DIAGNOSIS — M89.9 SKULL LESION: Primary | ICD-10-CM

## 2023-01-10 PROCEDURE — 3074F SYST BP LT 130 MM HG: CPT | Mod: CPTII,S$GLB,, | Performed by: NEUROLOGICAL SURGERY

## 2023-01-10 PROCEDURE — 99024 PR POST-OP FOLLOW-UP VISIT: ICD-10-PCS | Mod: S$GLB,,, | Performed by: NEUROLOGICAL SURGERY

## 2023-01-10 PROCEDURE — 1160F PR REVIEW ALL MEDS BY PRESCRIBER/CLIN PHARMACIST DOCUMENTED: ICD-10-PCS | Mod: CPTII,S$GLB,, | Performed by: NEUROLOGICAL SURGERY

## 2023-01-10 PROCEDURE — 3008F BODY MASS INDEX DOCD: CPT | Mod: CPTII,S$GLB,, | Performed by: NEUROLOGICAL SURGERY

## 2023-01-10 PROCEDURE — 99999 PR PBB SHADOW E&M-EST. PATIENT-LVL III: ICD-10-PCS | Mod: PBBFAC,,, | Performed by: NEUROLOGICAL SURGERY

## 2023-01-10 PROCEDURE — 99999 PR PBB SHADOW E&M-EST. PATIENT-LVL III: CPT | Mod: PBBFAC,,, | Performed by: NEUROLOGICAL SURGERY

## 2023-01-10 PROCEDURE — 3008F PR BODY MASS INDEX (BMI) DOCUMENTED: ICD-10-PCS | Mod: CPTII,S$GLB,, | Performed by: NEUROLOGICAL SURGERY

## 2023-01-10 PROCEDURE — 3074F PR MOST RECENT SYSTOLIC BLOOD PRESSURE < 130 MM HG: ICD-10-PCS | Mod: CPTII,S$GLB,, | Performed by: NEUROLOGICAL SURGERY

## 2023-01-10 PROCEDURE — 1159F PR MEDICATION LIST DOCUMENTED IN MEDICAL RECORD: ICD-10-PCS | Mod: CPTII,S$GLB,, | Performed by: NEUROLOGICAL SURGERY

## 2023-01-10 PROCEDURE — 3079F PR MOST RECENT DIASTOLIC BLOOD PRESSURE 80-89 MM HG: ICD-10-PCS | Mod: CPTII,S$GLB,, | Performed by: NEUROLOGICAL SURGERY

## 2023-01-10 PROCEDURE — 1159F MED LIST DOCD IN RCRD: CPT | Mod: CPTII,S$GLB,, | Performed by: NEUROLOGICAL SURGERY

## 2023-01-10 PROCEDURE — 3079F DIAST BP 80-89 MM HG: CPT | Mod: CPTII,S$GLB,, | Performed by: NEUROLOGICAL SURGERY

## 2023-01-10 PROCEDURE — 99024 POSTOP FOLLOW-UP VISIT: CPT | Mod: S$GLB,,, | Performed by: NEUROLOGICAL SURGERY

## 2023-01-10 PROCEDURE — 1160F RVW MEDS BY RX/DR IN RCRD: CPT | Mod: CPTII,S$GLB,, | Performed by: NEUROLOGICAL SURGERY

## 2023-01-10 NOTE — PROGRESS NOTES
"Subjective:   I, Lynn Hood, attest that this documentation has been prepared under the direction and in the presence of Adal Galdamez MD.     Patient ID: Gurwinder Sainz is a 32 y.o. male     Chief Complaint: No chief complaint on file.      HPI  Mr. Gurwinder Sainz is a 32 y.o. gentleman who presents today for 2 week post operative follow up of right frontal craniectomy for osteoma done on 12/22/2022. He was referred to NSGY by his PCP for right frontal calvarium lesion. Patient initially noticed the prominence in 2008. He had a CT head in 2017 which showed "small sessile appearing osteoma arising from the right frontal calvarium accounting for patient's complaint of a right scalp mass." The CT scan also demonstrated a 1.9 cm dural based calcification along the falx as well as hyperostosis of the right parietal-occipital calvarium. Patient states it has remained unchanged in size. However, he states it has become more prominent and visible with his receding hairline. Otherwise, pt presents asymptomatic and states this is non bothersome.  CT Head W Contrast (10/21/2022): Prominent ossification from the frontal calvarium represents the palpable abnormality.  This is similar dating back to 2017. Extensive ossification of the dura is identified with some mass effect in the posterior fossa as discussed above similar to the prior study of 2017.  I recommend right frontal craniectomy for osteoma and patient wish to proceed with surgery.    Today the pt reports he is doing well post operatively. He reports no complications since surgery and has been recovering well.    Review of Systems   Constitutional:  Negative for activity change, appetite change, fatigue, fever and unexpected weight change.   HENT:  Negative for facial swelling.    Eyes: Negative.    Respiratory: Negative.     Cardiovascular: Negative.    Gastrointestinal:  Negative for diarrhea, nausea and vomiting.   Endocrine: Negative.    Genitourinary: " Negative.    Musculoskeletal:  Negative for back pain, joint swelling, myalgias and neck pain.   Neurological:  Negative for dizziness, seizures, weakness, numbness and headaches.   Psychiatric/Behavioral: Negative.        Past Medical History:   Diagnosis Date    Asthma     Bipolar disorder        Objective:      Vitals:    01/10/23 1044   BP: 113/80   Pulse: (!) 59      Physical Exam  Constitutional:       General: He is not in acute distress.     Appearance: Normal appearance.   HENT:      Head: Normocephalic and atraumatic.   Pulmonary:      Effort: Pulmonary effort is normal.   Musculoskeletal:      Cervical back: Neck supple.   Neurological:      Mental Status: He is alert and oriented to person, place, and time.      GCS: GCS eye subscore is 4. GCS verbal subscore is 5. GCS motor subscore is 6.      Cranial Nerves: No cranial nerve deficit.      Comments: Incision site is dry, clean, and intact.      Pathology:  Frontal bone, right, craniectomy:   - Osteoma   - Negative for malignancy     IMAGING:  CTH WO Contrast (1/4/2022):  Expected postsurgical changes status post right frontal calvarial osteoma resection.    I have personally reviewed the images with the pt.      I, Dr. Adal Galdamez, personally performed the services described in this documentation. All medical record entries made by the scribe, Lynn Hood, were at my direction and in my presence.  I have reviewed the chart and agree that the record reflects my personal performance and is accurate and complete. Adal Galdamez MD. 01/10/2023    Assessment:       Osteoma.     Plan:   I have personally reviewed the CT with the pt which shows expected postsurgical changes status post right frontal calvarial osteoma resection.    I have reviewed the pathology report with the patient which is positive for osteoma.     I will schedule the patient for  1 year follow up with CT.

## 2023-01-10 NOTE — PROGRESS NOTES
Per Dr Galdamez's order, incision inspected. C/D/I. Swabbed w/ Betadine lollipop. Staples removed. Pt tolerated OK.          Stephany Estrada, RN  Nurse Navigator  Neurosurgery & Neuro-Oncology  [facility]

## 2023-01-10 NOTE — PATIENT INSTRUCTIONS
I have personally reviewed the CT with the pt which shows expected postsurgical changes status post right frontal calvarial osteoma resection.    I have reviewed the pathology report with the patient which is positive for osteoma.     I will schedule the patient for  1 year follow up with [facility]

## 2023-01-11 NOTE — PROGRESS NOTES
[facility]   NEURO-ONCOLOGICAL MULTIDISCIPLINARY TUMOR BOARD  PATIENT REVIEW FORM  ____________________________________________________________  Lynn MORIN, attest that this documentation has been prepared under the direction and in the presence of Adal Galdamez MD.     PATIENT ID: Gurwinder Sainz is a 32 y.o. male  DATE: 1/10/2023    This patient's relevant clinical data was reviewed before the multidisciplinary tumor board in order to establish an optimum treatment plan in this patient. The patient's clinical data were presented at our Multi-Disciplinary Tumor Board which included representatives of Neurosurgery, Neuro-Radiology, Neuro-Pathology, Radiation Oncology, Medical Oncology, Palliative Medicine.     PRESENTER:   Dr. Galdamez    PATIENT SUMMARY:   The patient is a 32 y.o.  male referred to [facility] for right frontal calvarium lesion. Patient initially noticed the prominence in 2008. He had a CT head in 2017 which showed small sessile appearing osteoma. Patient underwent craniotomy for right frontal osteoma on 12/22/2022.    Additionally, we reviewed previous medical and familial history of present illness, and recent lab results along with all available histopathologic and imaging studies.    IMAGING:   CTH WO Contrast (12/22/2022):  Expected postsurgical changes status post right frontal calvarial osteoma resection.    PATHOLOGY:  12- Frontal bone, right, craniectomy:   - Osteoma   - Negative for malignancy    BOARD RECOMMENDATIONS:   The tumor board considered available treatment options and made the following recommendations: Patient will follow up with Dr. Galdamez, neurosurgery, today.    National site-specific guidelines were discussed with respect to the case.     Tumor board is a meeting of clinicians from various specialty areas who evaluate and discuss patients for whom a multidisciplinary approach is being considered. Final determinations in the plan of care are those of the  provider(s). The responsibility for follow up recommendations given during tumor board is that of the provider.     CONSULT NEEDED:     [] Neurosurgery    [] Hem/Onc    [] Rad/Onc         [] Endocrinology     [] Neuro-ophthalmology    [] Palliative Medicine      []  Other:       PRESENTATION AT CANCER CONFERENCE:         [] Prospective    [x] Retrospective     [] Follow-Up          [] Eligible for clinical trial/Clinical trial status:

## (undated) DEVICE — SPONGE NEURO 1/4X1/4

## (undated) DEVICE — BUR BONE CUT MICRO TPS 3X3.8MM

## (undated) DEVICE — TRAY SKIN SCRUB WET PREMIUM

## (undated) DEVICE — NDL HYPO REG 25G X 1 1/2

## (undated) DEVICE — TOWEL OR DISP STRL BLUE 4/PK

## (undated) DEVICE — DRESSING SURGICAL 1X1

## (undated) DEVICE — TRAY MINOR GEN SURG

## (undated) DEVICE — BURR ROUTER TAPERED

## (undated) DEVICE — SUT 4-0 12-30IN SILK

## (undated) DEVICE — BLADE SURG CARBON STEEL SZ11

## (undated) DEVICE — SEE MEDLINE ITEM 157128

## (undated) DEVICE — PANTIES FEMININE NAPKIN LG/XLG

## (undated) DEVICE — BURR ACORN 7.5MM

## (undated) DEVICE — DRESSING SURGICAL 1/2X1/2

## (undated) DEVICE — GLOVE SURG BIOGEL LATEX SZ 7.5

## (undated) DEVICE — SUT 4/0 18IN NUROLON BLK B

## (undated) DEVICE — CONTAINER SPECIMEN STRL 4OZ

## (undated) DEVICE — CORD BIPOLAR 12 FOOT

## (undated) DEVICE — GAUZE FLUFF XXLG 36X36 2 PLY

## (undated) DEVICE — DRAPE T THYROID STERILE

## (undated) DEVICE — SEE MEDLINE ITEM 152622

## (undated) DEVICE — SUT CTD VICRYL BR CR/SH VIL

## (undated) DEVICE — SUT SILK 3-0 STRANDS 30IN

## (undated) DEVICE — SPONGE DERMA 8PLY 2X2

## (undated) DEVICE — LINER GLOVE POWDERFREE 8

## (undated) DEVICE — SUT VICRYL 3-0 27 SH

## (undated) DEVICE — BAG BANDED 10X10IN

## (undated) DEVICE — DIFFUSER

## (undated) DEVICE — ELECTRODE REM PLYHSV RETURN 9

## (undated) DEVICE — COVER PROXIMA MAYO STAND

## (undated) DEVICE — SUT MONOCRYL 4-0 PS-2

## (undated) DEVICE — TAPE SURG MEDIPORE 6X72IN

## (undated) DEVICE — TUBE FRAZIER 5MM 2FT SOFT TIP

## (undated) DEVICE — DRAPE SURGICAL STERI IRRG PCH

## (undated) DEVICE — RUBBERBAND STERILE 3X1/8IN

## (undated) DEVICE — DRESSING SURGICAL 1X3

## (undated) DEVICE — DRAPE OPMI STERILE

## (undated) DEVICE — DRAIN PENROSE XRAY 12 X 1/4 ST

## (undated) DEVICE — ROUTER TAPERED 2.3MM

## (undated) DEVICE — COTTON BALLS 1/2IN

## (undated) DEVICE — ADHESIVE MASTISOL VIAL 48/BX

## (undated) DEVICE — HOOK STAY ELAS 5MM 8EA/PK

## (undated) DEVICE — SEE MEDLINE ITEM 157148

## (undated) DEVICE — DRAPE STERI-DRAPE 1000 17X11IN

## (undated) DEVICE — CATH IV INTROCAN 24G X 3/4

## (undated) DEVICE — DRESSING TRANS 4X4 TEGADERM

## (undated) DEVICE — MARKER SKIN STND TIP BLUE BARR

## (undated) DEVICE — FORCEP STRAIGHT DISP

## (undated) DEVICE — GOWN SURGICAL X-LARGE

## (undated) DEVICE — DURAPREP SURG SCRUB 26ML

## (undated) DEVICE — MARKERS SPHERZ PASSIVE

## (undated) DEVICE — DRAPE STERI INSTRUMENT 1018

## (undated) DEVICE — HEMOSTAT SURGICEL 4X8IN

## (undated) DEVICE — DRESSING TELFA STRL 4X3 LF

## (undated) DEVICE — CARTRIDGE OIL

## (undated) DEVICE — DRESSING TRANS 2X2 TEGADERM

## (undated) DEVICE — SEE MEDLINE ITEM 156905